# Patient Record
Sex: MALE | Race: WHITE | NOT HISPANIC OR LATINO | Employment: OTHER | URBAN - METROPOLITAN AREA
[De-identification: names, ages, dates, MRNs, and addresses within clinical notes are randomized per-mention and may not be internally consistent; named-entity substitution may affect disease eponyms.]

---

## 2018-05-04 DIAGNOSIS — I35.0 NODULAR CALCIFIC AORTIC VALVE STENOSIS: Primary | ICD-10-CM

## 2018-05-24 RX ORDER — POTASSIUM CHLORIDE 20 MEQ/1
TABLET, EXTENDED RELEASE ORAL
COMMUNITY
Start: 2018-03-15

## 2018-05-24 RX ORDER — ATENOLOL AND CHLORTHALIDONE TABLET 50; 25 MG/1; MG/1
TABLET ORAL
COMMUNITY
Start: 2018-04-23

## 2018-05-24 RX ORDER — SIMVASTATIN 40 MG/1
TABLET, FILM COATED ORAL
COMMUNITY
Start: 2018-04-23

## 2018-05-24 RX ORDER — POLYETHYLENE GLYCOL 3350 17 G/17G
POWDER, FOR SOLUTION ORAL
COMMUNITY
Start: 2018-04-17

## 2018-05-24 RX ORDER — TRAMADOL HYDROCHLORIDE 50 MG/1
TABLET ORAL
COMMUNITY
Start: 2018-05-04

## 2018-05-24 RX ORDER — FUROSEMIDE 20 MG/1
TABLET ORAL
COMMUNITY
Start: 2018-04-10 | End: 2018-09-07 | Stop reason: DRUGHIGH

## 2018-05-24 RX ORDER — FUROSEMIDE 40 MG/1
TABLET ORAL
COMMUNITY
Start: 2018-04-20

## 2018-05-24 RX ORDER — MUPIROCIN 20 MG/G
OINTMENT TOPICAL
COMMUNITY
Start: 2018-03-22

## 2018-05-28 ENCOUNTER — OFFICE VISIT (OUTPATIENT)
Dept: CARDIOLOGY | Facility: CLINIC | Age: 78
End: 2018-05-28
Payer: MEDICARE

## 2018-05-28 ENCOUNTER — HOSPITAL ENCOUNTER (OUTPATIENT)
Dept: PULMONOLOGY | Facility: CLINIC | Age: 78
Discharge: HOME | End: 2018-05-28
Payer: MEDICARE

## 2018-05-28 ENCOUNTER — HOSPITAL ENCOUNTER (OUTPATIENT)
Dept: CARDIOLOGY | Facility: CLINIC | Age: 78
Discharge: HOME | End: 2018-05-28
Attending: INTERNAL MEDICINE
Payer: MEDICARE

## 2018-05-28 ENCOUNTER — HOSPITAL ENCOUNTER (OUTPATIENT)
Dept: RADIOLOGY | Facility: HOSPITAL | Age: 78
Discharge: HOME | End: 2018-05-28
Attending: INTERNAL MEDICINE
Payer: MEDICARE

## 2018-05-28 VITALS
DIASTOLIC BLOOD PRESSURE: 60 MMHG | WEIGHT: 235.88 LBS | OXYGEN SATURATION: 97 % | HEIGHT: 78 IN | BODY MASS INDEX: 27.29 KG/M2 | HEART RATE: 84 BPM | SYSTOLIC BLOOD PRESSURE: 119 MMHG

## 2018-05-28 VITALS — HEIGHT: 71 IN | WEIGHT: 234 LBS | BODY MASS INDEX: 32.76 KG/M2

## 2018-05-28 DIAGNOSIS — I35.0 NODULAR CALCIFIC AORTIC VALVE STENOSIS: Primary | ICD-10-CM

## 2018-05-28 DIAGNOSIS — I35.0 NODULAR CALCIFIC AORTIC VALVE STENOSIS: ICD-10-CM

## 2018-05-28 DIAGNOSIS — K21.9 GASTROESOPHAGEAL REFLUX DISEASE WITHOUT ESOPHAGITIS: ICD-10-CM

## 2018-05-28 DIAGNOSIS — N18.9 CHRONIC KIDNEY DISEASE, UNSPECIFIED CKD STAGE: ICD-10-CM

## 2018-05-28 DIAGNOSIS — M45.9 ANKYLOSING SPONDYLITIS, UNSPECIFIED SITE OF SPINE: ICD-10-CM

## 2018-05-28 DIAGNOSIS — I35.0 NONRHEUMATIC AORTIC VALVE STENOSIS: Primary | ICD-10-CM

## 2018-05-28 DIAGNOSIS — I10 HYPERTENSION, UNSPECIFIED TYPE: ICD-10-CM

## 2018-05-28 DIAGNOSIS — E78.49 OTHER HYPERLIPIDEMIA: ICD-10-CM

## 2018-05-28 LAB
AORTIC VALVE STENOSIS: ABNORMAL
ESTIMATED PA SYSTOLIC PRESSURE: 30.69
MITRAL VALVE MOBILITY: NORMAL
MITRAL VALVE STENOSIS: ABNORMAL
PRE FEV1 FVC: 72
PRE FEV1: 1.75
PRE FVC: 2.43
PREDICTED FEV1 FVC: 77
PREDICTED FEV1: 3.16
PREDICTED FVC: 4.04
RETIRED EF AND QEF - SEE NOTES: 65 (ref 55–65)
TRICUSPID VALVE REGURGITATION: ABNORMAL

## 2018-05-28 PROCEDURE — 99999 PR PBB SHADOW E&M-EST. PATIENT-LVL III: CPT | Mod: PBBFAC,,,

## 2018-05-28 PROCEDURE — 82803 BLOOD GASES ANY COMBINATION: CPT | Mod: S$GLB,,, | Performed by: INTERNAL MEDICINE

## 2018-05-28 PROCEDURE — 96374 THER/PROPH/DIAG INJ IV PUSH: CPT | Mod: 59,S$GLB,, | Performed by: INTERNAL MEDICINE

## 2018-05-28 PROCEDURE — 94010 BREATHING CAPACITY TEST: CPT | Mod: S$GLB,,, | Performed by: INTERNAL MEDICINE

## 2018-05-28 PROCEDURE — 94618 PULMONARY STRESS TESTING: CPT | Mod: S$GLB,,, | Performed by: INTERNAL MEDICINE

## 2018-05-28 PROCEDURE — 3074F SYST BP LT 130 MM HG: CPT | Mod: CPTII,S$GLB,, | Performed by: THORACIC SURGERY (CARDIOTHORACIC VASCULAR SURGERY)

## 2018-05-28 PROCEDURE — 74174 CTA ABD&PLVS W/CONTRAST: CPT | Mod: 26,,, | Performed by: RADIOLOGY

## 2018-05-28 PROCEDURE — 3078F DIAST BP <80 MM HG: CPT | Mod: CPTII,S$GLB,, | Performed by: THORACIC SURGERY (CARDIOTHORACIC VASCULAR SURGERY)

## 2018-05-28 PROCEDURE — 36600 WITHDRAWAL OF ARTERIAL BLOOD: CPT | Mod: S$GLB,,, | Performed by: INTERNAL MEDICINE

## 2018-05-28 PROCEDURE — 99205 OFFICE O/P NEW HI 60 MIN: CPT | Mod: S$GLB,,, | Performed by: THORACIC SURGERY (CARDIOTHORACIC VASCULAR SURGERY)

## 2018-05-28 PROCEDURE — 25500020 PHARM REV CODE 255: Performed by: INTERNAL MEDICINE

## 2018-05-28 PROCEDURE — 93306 TTE W/DOPPLER COMPLETE: CPT | Mod: S$GLB,,, | Performed by: INTERNAL MEDICINE

## 2018-05-28 PROCEDURE — 3078F DIAST BP <80 MM HG: CPT | Mod: CPTII,S$GLB,, | Performed by: INTERNAL MEDICINE

## 2018-05-28 PROCEDURE — 94729 DIFFUSING CAPACITY: CPT | Mod: S$GLB,,, | Performed by: INTERNAL MEDICINE

## 2018-05-28 PROCEDURE — 99204 OFFICE O/P NEW MOD 45 MIN: CPT | Mod: S$GLB,,, | Performed by: INTERNAL MEDICINE

## 2018-05-28 PROCEDURE — 71275 CT ANGIOGRAPHY CHEST: CPT | Mod: 26,,, | Performed by: RADIOLOGY

## 2018-05-28 PROCEDURE — 3074F SYST BP LT 130 MM HG: CPT | Mod: CPTII,S$GLB,, | Performed by: INTERNAL MEDICINE

## 2018-05-28 PROCEDURE — 71275 CT ANGIOGRAPHY CHEST: CPT | Mod: TC

## 2018-05-28 RX ORDER — OMEPRAZOLE 20 MG/1
20 CAPSULE, DELAYED RELEASE ORAL
COMMUNITY

## 2018-05-28 RX ORDER — ASPIRIN 81 MG/1
81 TABLET ORAL
COMMUNITY

## 2018-05-28 RX ORDER — CLOPIDOGREL BISULFATE 75 MG/1
75 TABLET ORAL DAILY
Qty: 30 TABLET | Refills: 11 | Status: ON HOLD | OUTPATIENT
Start: 2018-05-28 | End: 2018-06-08 | Stop reason: HOSPADM

## 2018-05-28 RX ORDER — SODIUM CHLORIDE 0.9 % (FLUSH) 0.9 %
5 SYRINGE (ML) INJECTION
Status: CANCELLED | OUTPATIENT
Start: 2018-05-28

## 2018-05-28 RX ORDER — SODIUM CHLORIDE 9 MG/ML
INJECTION, SOLUTION INTRAVENOUS ONCE
Status: CANCELLED | OUTPATIENT
Start: 2018-05-28 | End: 2018-05-28

## 2018-05-28 RX ORDER — DIPHENHYDRAMINE HCL 25 MG
50 CAPSULE ORAL
Status: CANCELLED | OUTPATIENT
Start: 2018-05-28

## 2018-05-28 RX ORDER — ACETAMINOPHEN 500 MG
500 TABLET ORAL
COMMUNITY

## 2018-05-28 RX ADMIN — IOHEXOL 100 ML: 350 INJECTION, SOLUTION INTRAVENOUS at 10:05

## 2018-05-28 NOTE — PROGRESS NOTES
"Subjective:    Patient ID:  Saumya Wilcox is a 78 y.o. male who presents for evaluation of Aortic Stenosis  Dr. Rojo    HPI   Mr. Wilcox is a 78 year old gentleman referred by Dr. Rojo for evaluation of Aortic Stenosis. He has a PMHx of chronic hip and current back pain (on tramodol multiple times a day) walks with a walker, ankylosing spondylitis, HTN, HLD, chronic diastolic heart failure, GERD. He cannot perform his ADLs and has assistance by family. He recalls being told about his Aortic Stenosis about a year ago and his SOB/AMAYA has worsened over the past 6 months. He did have rheumatic fever as a child at age 6. He believes that he was told he "had a hole in his heart" but he did not have a bubble study today. He gets dyspneic ambulating 50ft with his walker to the mailbox. He denies chest pain, light headedness, syncope. He spends his days in a chair and only ambulates with his walker to get the mail. His SOB is somewhat alleviated with Lasix. He has orthopnea for which he sleeps in a recliner. He does drink 2+ cokes a day. He has never had a coronary angiogram.     Saumya Wilcox is a 78 y.o. male referred by Dr Rojo for evaluation of severe AS (NYHA Class IV sx).    he has undergone the following TAVR work-up:   ECHO (Date 5/25/2018): JOEY 0.84 cm2, AVAi 0.36 cm2/m2, peak aortic jet velocity 4.6 m/s,MG 58 mmHg, LVEF 65%  LHC (Date ): Needs   STS: 4.63%   Frailty: 3/4   Iliacs are >9.54 on L and > 9.47 on R   LVOT area by CTA is 4.86 cm2 and Avg Diameter is 24.9 (distance 29.0 mm x 21.6 mm) per Dr Phelps   CT Incidental Findings: Ankylosing Spondylitis   CTS risk assessment: Inoperable due to impaired mobility per Dr. Aburto   he is currently Cohort C, per Dr Phelps due to debility, impaired mobility   Rhythm Issues: Pending   6 MWT: 500 ft (in 3:39)  PFTs: moderate FEV1 56% predicted, FVC 60% predicted, MVV 38% predicted, DLCO 102% predicted    If able to pre-hab may be a candidate for 26mm S3 " (7% OS) via RTF access.       Review of Systems   Constitution: Positive for weakness. Negative for chills, diaphoresis, fever, weight gain and weight loss.   HENT: Negative for sore throat.    Eyes: Negative for blurred vision, vision loss in left eye, vision loss in right eye and visual disturbance.   Cardiovascular: Positive for dyspnea on exertion and orthopnea. Negative for chest pain, claudication, leg swelling, near-syncope, palpitations, paroxysmal nocturnal dyspnea and syncope.   Respiratory: Positive for shortness of breath. Negative for cough, hemoptysis, sputum production and wheezing.    Endocrine: Negative for cold intolerance and heat intolerance.   Hematologic/Lymphatic: Negative for adenopathy. Does not bruise/bleed easily.   Skin: Negative for rash.   Musculoskeletal: Positive for joint pain and muscle weakness. Negative for falls and myalgias.   Gastrointestinal: Negative for abdominal pain, change in bowel habit, constipation, diarrhea, melena and nausea.   Genitourinary: Positive for bladder incontinence.   Neurological: Negative for dizziness, focal weakness, headaches, light-headedness and numbness.   Psychiatric/Behavioral: Negative for altered mental status.        History reviewed. No pertinent past medical history.  Current Outpatient Prescriptions on File Prior to Visit   Medication Sig Dispense Refill    atenolol-chlorthalidone (TENORETIC) 50-25 mg Tab       furosemide (LASIX) 20 MG tablet       furosemide (LASIX) 40 MG tablet       mupirocin (BACTROBAN) 2 % ointment       polyethylene glycol (GLYCOLAX) 17 gram/dose powder       potassium chloride SA (K-DUR,KLOR-CON) 20 MEQ tablet       simvastatin (ZOCOR) 40 MG tablet       traMADol (ULTRAM) 50 mg tablet        Current Facility-Administered Medications on File Prior to Visit   Medication Dose Route Frequency Provider Last Rate Last Dose    [COMPLETED] omnipaque 350 iohexol 100 mL  100 mL Intravenous ONCE PRN Beto Phelps,  "MD   100 mL at 05/28/18 1035     Vitals:    05/28/18 1438 05/28/18 1442   BP: 125/64 119/60   BP Location: Left arm Right arm   Patient Position: Sitting Sitting   BP Method: Large (Automatic) Large (Automatic)   Pulse: 86 84   SpO2: 97%    Weight: 107 kg (235 lb 14.3 oz)    Height: 6' 6" (1.981 m)      Body mass index is 27.26 kg/m².         Objective:    Physical Exam   Constitutional: He is oriented to person, place, and time. He appears well-developed and well-nourished.   HENT:   Head: Normocephalic and atraumatic.   Eyes: EOM are normal. Pupils are equal, round, and reactive to light.   Neck: Neck supple. JVD present. No tracheal deviation present. No thyromegaly present.   Cardiovascular: Normal rate, regular rhythm, S1 normal, S2 normal, intact distal pulses and normal pulses.  PMI is not displaced.  Exam reveals no gallop and no friction rub.    Murmur heard.  Pulmonary/Chest: Effort normal and breath sounds normal. No respiratory distress. He has no wheezes. He has no rales. He exhibits no tenderness.   Abdominal: Soft. Bowel sounds are normal. He exhibits distension. He exhibits no mass. There is no hepatosplenomegaly. There is no tenderness.   Musculoskeletal: Normal range of motion. He exhibits edema. He exhibits no tenderness.   Neurological: He is alert and oriented to person, place, and time.   Skin: Skin is warm and dry. No rash noted.   Psychiatric: He has a normal mood and affect. His behavior is normal.         Assessment:         Nodular calcific aortic valve stenosis  Saumya Wilcox is a 78 y.o. male referred by Dr Rojo for evaluation of severe AS (NYHA Class IV sx).    he has undergone the following TAVR work-up:   ECHO (Date 5/25/2018): JOEY 0.84 cm2, AVAi 0.36 cm2/m2, peak aortic jet velocity 4.6 m/s,MG 58 mmHg, LVEF 65%  LHC (Date ): Needs   STS: 4.63%   Frailty: 3/4   Iliacs are >9.54 on L and > 9.47 on R   LVOT area by CTA is 4.86 cm2 and Avg Diameter is 24.9 (distance 29.0 mm x 21.6 " mm) per Dr Phelps   CT Incidental Findings: Ankylosing Spondylitis   CTS risk assessment: Inoperable due to impaired mobility per Dr. Aburto   he is currently Cohort C, per Dr Phelps due to debility, impaired mobility   Rhythm Issues: Pending   6 MWT: 500 ft (in 3:39)  PFTs: moderate FEV1 56% predicted, FVC 60% predicted, MVV 38% predicted, DLCO 102% predicted    If able to pre-hab may be a candidate for 26mm S3 (7% OS) via RTF access.       HTN (hypertension)  Well controlled on current regimen     Other hyperlipidemia  Stable on statin     Gastroesophageal reflux disease without esophagitis  On PPI     Ankylosing spondylitis  On tramadol for pain  Does not follow with anyone for this  Found on CTA     Plan:       Coronary Angiogram +/- PCI with Dr. Phelps   Risks, Benefits, and alternatives of the procedure were discussed in detail with the patient. Questions were answered and patient has voiced understanding. He is agreeable to proceed. Informed consent obtained.   L fem access   Continue ASA, Load Plavix  Check PRU upon arrival   Will re-check frailty during that admission   Educated patient and family on TAVR and to pre-hab as bridge to TAVR     Staff:  I have personally taken the history and examined this patient and agree with the fellow's note as stated above and amended it accordingly :-) Patient has multiple body aches and pains and also has Ankylosing Spondylitis.  He is chronically on tramadol for all of his pains (back, hip, shoulder, neck pains).  He is 4/4 Frail and sits in a chair all day.  I told him very strongly that if he didn't prehab we will not be able to offer him TAVR.  Will recheck frailty when he returns for his diagnostic cath.

## 2018-05-28 NOTE — ASSESSMENT & PLAN NOTE
Saumya Wilcox is a 78 y.o. male referred by Dr Rojo for evaluation of severe AS (NYHA Class IV sx).    he has undergone the following TAVR work-up:   ECHO (Date 5/25/2018): JEOY 0.84 cm2, AVAi 0.36 cm2/m2, peak aortic jet velocity 4.6 m/s,MG 58 mmHg, LVEF 65%  LHC (Date ): Needs   STS: 4.63%   Frailty: 3/4   Iliacs are >9.54 on L and > 9.47 on R   LVOT area by CTA is 4.86 cm2 and Avg Diameter is 24.9 (distance 29.0 mm x 21.6 mm) per Dr Phelps   CT Incidental Findings: Ankylosing Spondylitis   CTS risk assessment: Inoperable due to impaired mobility per Dr. Aburto   he is currently Cohort C, per Dr Phelps due to debility, impaired mobility   Rhythm Issues: Pending   6 MWT: 500 ft (in 3:39)  PFTs: moderate FEV1 56% predicted, FVC 60% predicted, MVV 38% predicted, DLCO 102% predicted    If able to pre-hab may be a candidate for 26mm S3 (7% OS) via RTF access.

## 2018-05-28 NOTE — PROGRESS NOTES
Patient identified via spelling of name and date of birth. Patient denies blood transfusion reaction. Consent obtained for use of contrast. Optison 3ml IVP vortristin Merlos. 22 gauge saline lock started in right forearm under aseptic technique. Optison 3ml IVP used as contrast for 2 d imaging. Saline lock d/dileep and pressure dressing applied. Tolerated procedure well.

## 2018-05-29 NOTE — PROCEDURES
Saumya Wilcox is a 78 y.o.  male patient, who presents for a 6 minute walk test ordered by Mattie SLADE  The diagnosis is Aortic Valve Disorder.  The patient's BMI is 32.7 kg/m2.  Predicted distance (lower limit of normal) is 248.35 meters.      Test Results:    The test was completed with stops.  The patient stopped 1 times for a total of 92 seconds.  The total time walked was 268 seconds.  During walking, the patient reported:  Dyspnea. The patient used a walker  during testing.     05/28/2018---------Distance: 152.4 meters (500 feet)     O2 Sat % Supplemental Oxygen Heart Rate Blood Pressure Honey Scale   Pre-exercise  (Resting) 96 % Room Air 70 bpm 121/57 mmHg 0   During Exercise 98 % Room Air 100 bpm 146/69 mmHg 5-6   Post-exercise  (Recovery) 96 % Room Air  91 bpm   mmHg       Recovery Time: 210 seconds    Performing nurse/tech: Estopinal RRT      PREVIOUS STUDY:   The patient has not had a previous study.      CLINICAL INTERPRETATION:  Six minute walk distance is 152.4 meters (500 feet) with heavy dyspnea.  During exercise, there was no significant desaturation while breathing room air.  Blood pressure remained stable and Heart rate increased significantly with walking.  This may represent a tachycardic response to exercise.  The patient did not report non-pulmonary symptoms during exercise.  Significant exercise impairment is likely due to cardiovascular causes.  The patient did complete the study, walking 268 seconds of the 360 second test.  No previous study performed.  Based upon age and body mass index, exercise capacity is less than predicted.

## 2018-06-01 NOTE — PROGRESS NOTES
"HPI   Mr. Wilcox is a 78 year old gentleman referred by Dr. Rojo for evaluation of Aortic Stenosis. He has a PMHx of chronic hip and current back pain (on tramodol multiple times a day) walks with a walker, ankylosing spondylitis, HTN, HLD, chronic diastolic heart failure, GERD. He cannot perform his ADLs and has assistance by family. He recalls being told about his Aortic Stenosis about a year ago and his SOB/AMAYA has worsened over the past 6 months. He did have rheumatic fever as a child at age 6. He believes that he was told he "had a hole in his heart" but he did not have a bubble study today. He gets dyspneic ambulating 50ft with his walker to the mailbox. He denies chest pain, light headedness, syncope. He spends his days in a chair and only ambulates with his walker to get the mail. His SOB is somewhat alleviated with Lasix. He has orthopnea for which he sleeps in a recliner. He does drink 2+ cokes a day. He has never had a coronary angiogram.      Saumya Wilcox is a 78 y.o. male referred by Dr Rojo for evaluation of severe AS (NYHA Class IV sx).     he has undergone the following TAVR work-up:   · ECHO (Date 5/25/2018): JOEY 0.84 cm2, AVAi 0.36 cm2/m2, peak aortic jet velocity 4.6 m/s,MG 58 mmHg, LVEF 65%  · LHC (Date ): Needs   · STS: 4.63%   · Frailty: 3/4   · Iliacs are >9.54 on L and > 9.47 on R   · LVOT area by CTA is 4.86 cm2 and Avg Diameter is 24.9 (distance 29.0 mm x 21.6 mm) per Dr Phelps   · CT Incidental Findings: Ankylosing Spondylitis     · Rhythm Issues: Pending   · 6 MWT: 500 ft (in 3:39)  · PFTs: moderate FEV1 56% predicted, FVC 60% predicted, MVV 38% predicted, DLCO 102% predicted     If able to pre-hab may be a candidate for 26mm S3 (7% OS) via RTF access.         Review of Systems   Constitution: Positive for weakness. Negative for chills, diaphoresis, fever, weight gain and weight loss.   HENT: Negative for sore throat.    Eyes: Negative for blurred vision, vision loss in left " eye, vision loss in right eye and visual disturbance.   Cardiovascular: Positive for dyspnea on exertion and orthopnea. Negative for chest pain, claudication, leg swelling, near-syncope, palpitations, paroxysmal nocturnal dyspnea and syncope.   Respiratory: Positive for shortness of breath. Negative for cough, hemoptysis, sputum production and wheezing.    Endocrine: Negative for cold intolerance and heat intolerance.   Hematologic/Lymphatic: Negative for adenopathy. Does not bruise/bleed easily.   Skin: Negative for rash.   Musculoskeletal: Positive for joint pain and muscle weakness. Negative for falls and myalgias.   Gastrointestinal: Negative for abdominal pain, change in bowel habit, constipation, diarrhea, melena and nausea.   Genitourinary: Positive for bladder incontinence.   Neurological: Negative for dizziness, focal weakness, headaches, light-headedness and numbness.   Psychiatric/Behavioral: Negative for altered mental status.         History reviewed. No pertinent past medical history.         Current Outpatient Prescriptions on File Prior to Visit   Medication Sig Dispense Refill    atenolol-chlorthalidone (TENORETIC) 50-25 mg Tab          furosemide (LASIX) 20 MG tablet          furosemide (LASIX) 40 MG tablet          mupirocin (BACTROBAN) 2 % ointment          polyethylene glycol (GLYCOLAX) 17 gram/dose powder          potassium chloride SA (K-DUR,KLOR-CON) 20 MEQ tablet          simvastatin (ZOCOR) 40 MG tablet          traMADol (ULTRAM) 50 mg tablet                      Current Facility-Administered Medications on File Prior to Visit   Medication Dose Route Frequency Provider Last Rate Last Dose    [COMPLETED] omnipaque 350 iohexol 100 mL  100 mL Intravenous ONCE PRN Beto Phelsp MD   100 mL at 05/28/18 1035      Vitals        Vitals:     05/28/18 1438 05/28/18 1442   BP: 125/64 119/60   BP Location: Left arm Right arm   Patient Position: Sitting Sitting   BP Method: Large  "(Automatic) Large (Automatic)   Pulse: 86 84   SpO2: 97%     Weight: 107 kg (235 lb 14.3 oz)     Height: 6' 6" (1.981 m)           Body mass index is 27.26 kg/m².        Objective:    Physical Exam   Constitutional: He is oriented to person, place, and time. He appears well-developed and well-nourished.   HENT:   Head: Normocephalic and atraumatic.   Eyes: EOM are normal. Pupils are equal, round, and reactive to light.   Neck: Neck supple. JVD present. No tracheal deviation present. No thyromegaly present.   Cardiovascular: Normal rate, regular rhythm, S1 normal, S2 normal, intact distal pulses and normal pulses.  PMI is not displaced.  Exam reveals no gallop and no friction rub.    Murmur heard.  Pulmonary/Chest: Effort normal and breath sounds normal. No respiratory distress. He has no wheezes. He has no rales. He exhibits no tenderness.   Abdominal: Soft. Bowel sounds are normal. He exhibits distension. He exhibits no mass. There is no hepatosplenomegaly. There is no tenderness.   Musculoskeletal: Normal range of motion. He exhibits edema. He exhibits no tenderness.   Neurological: He is alert and oriented to person, place, and time.   Skin: Skin is warm and dry. No rash noted.   Psychiatric: He has a normal mood and affect. His behavior is normal.          Assessment:         Nodular calcific aortic valve stenosis  Saumya Wilcox is a 78 y.o. male referred by Dr Rojo for evaluation of severe AS (NYHA Class IV sx).        Plan:     Inoperable due to impaired mobility and severe debility, currently cohort C for TAVR and will prehab  "

## 2018-06-08 ENCOUNTER — SURGERY (OUTPATIENT)
Age: 78
End: 2018-06-08

## 2018-06-08 ENCOUNTER — HOSPITAL ENCOUNTER (OUTPATIENT)
Facility: HOSPITAL | Age: 78
Discharge: HOME | End: 2018-06-08
Attending: INTERNAL MEDICINE | Admitting: INTERNAL MEDICINE
Payer: MEDICARE

## 2018-06-08 VITALS
DIASTOLIC BLOOD PRESSURE: 55 MMHG | WEIGHT: 240 LBS | HEART RATE: 77 BPM | TEMPERATURE: 98 F | BODY MASS INDEX: 30.8 KG/M2 | SYSTOLIC BLOOD PRESSURE: 108 MMHG | OXYGEN SATURATION: 96 % | HEIGHT: 74 IN | RESPIRATION RATE: 20 BRPM

## 2018-06-08 DIAGNOSIS — N18.9 CHRONIC KIDNEY DISEASE, UNSPECIFIED CKD STAGE: ICD-10-CM

## 2018-06-08 DIAGNOSIS — I10 ESSENTIAL (PRIMARY) HYPERTENSION: ICD-10-CM

## 2018-06-08 DIAGNOSIS — I35.0 NODULAR CALCIFIC AORTIC VALVE STENOSIS: ICD-10-CM

## 2018-06-08 LAB
ABO + RH BLD: NORMAL
ANION GAP SERPL CALC-SCNC: 13 MMOL/L
APTT BLDCRRT: 24.3 SEC
BASOPHILS # BLD AUTO: 0.02 K/UL
BASOPHILS NFR BLD: 0.2 %
BLD GP AB SCN CELLS X3 SERPL QL: NORMAL
BUN SERPL-MCNC: 23 MG/DL
CALCIUM SERPL-MCNC: 9.6 MG/DL
CHLORIDE SERPL-SCNC: 90 MMOL/L
CO2 SERPL-SCNC: 36 MMOL/L
CORONARY STENOSIS: ABNORMAL
CREAT SERPL-MCNC: 1 MG/DL
DIFFERENTIAL METHOD: ABNORMAL
EOSINOPHIL # BLD AUTO: 0.1 K/UL
EOSINOPHIL NFR BLD: 0.7 %
ERYTHROCYTE [DISTWIDTH] IN BLOOD BY AUTOMATED COUNT: 12.3 %
EST. GFR  (AFRICAN AMERICAN): >60 ML/MIN/1.73 M^2
EST. GFR  (NON AFRICAN AMERICAN): >60 ML/MIN/1.73 M^2
GLUCOSE SERPL-MCNC: 129 MG/DL
HCT VFR BLD AUTO: 41 %
HGB BLD-MCNC: 14.4 G/DL
IMM GRANULOCYTES # BLD AUTO: 0.03 K/UL
IMM GRANULOCYTES NFR BLD AUTO: 0.3 %
INR PPP: 0.9
LYMPHOCYTES # BLD AUTO: 1.7 K/UL
LYMPHOCYTES NFR BLD: 17.1 %
MCH RBC QN AUTO: 34.1 PG
MCHC RBC AUTO-ENTMCNC: 35.1 G/DL
MCV RBC AUTO: 97 FL
MONOCYTES # BLD AUTO: 0.6 K/UL
MONOCYTES NFR BLD: 6.4 %
NEUTROPHILS # BLD AUTO: 7.6 K/UL
NEUTROPHILS NFR BLD: 75.3 %
NRBC BLD-RTO: 0 /100 WBC
PLATELET # BLD AUTO: 217 K/UL
PLATELET RESPONSE PLAVIX: 147 PRU
PMV BLD AUTO: 9.4 FL
POTASSIUM SERPL-SCNC: 2.6 MMOL/L
PROTHROMBIN TIME: 9.9 SEC
RBC # BLD AUTO: 4.22 M/UL
SODIUM SERPL-SCNC: 139 MMOL/L
WBC # BLD AUTO: 10.02 K/UL

## 2018-06-08 PROCEDURE — 93005 ELECTROCARDIOGRAM TRACING: CPT

## 2018-06-08 PROCEDURE — 99152 MOD SED SAME PHYS/QHP 5/>YRS: CPT

## 2018-06-08 PROCEDURE — 63600175 PHARM REV CODE 636 W HCPCS

## 2018-06-08 PROCEDURE — 25000003 PHARM REV CODE 250: Performed by: NURSE PRACTITIONER

## 2018-06-08 PROCEDURE — 25000003 PHARM REV CODE 250: Performed by: INTERNAL MEDICINE

## 2018-06-08 PROCEDURE — 25000003 PHARM REV CODE 250

## 2018-06-08 PROCEDURE — 80048 BASIC METABOLIC PNL TOTAL CA: CPT

## 2018-06-08 PROCEDURE — 85730 THROMBOPLASTIN TIME PARTIAL: CPT

## 2018-06-08 PROCEDURE — 85576 BLOOD PLATELET AGGREGATION: CPT

## 2018-06-08 PROCEDURE — 86901 BLOOD TYPING SEROLOGIC RH(D): CPT

## 2018-06-08 PROCEDURE — 93010 ELECTROCARDIOGRAM REPORT: CPT | Mod: ,,, | Performed by: INTERNAL MEDICINE

## 2018-06-08 PROCEDURE — 85025 COMPLETE CBC W/AUTO DIFF WBC: CPT

## 2018-06-08 PROCEDURE — 93454 CORONARY ARTERY ANGIO S&I: CPT | Mod: 26,,, | Performed by: INTERNAL MEDICINE

## 2018-06-08 PROCEDURE — 85610 PROTHROMBIN TIME: CPT

## 2018-06-08 PROCEDURE — 99152 MOD SED SAME PHYS/QHP 5/>YRS: CPT | Mod: ,,, | Performed by: INTERNAL MEDICINE

## 2018-06-08 RX ORDER — DIPHENHYDRAMINE HCL 50 MG
50 CAPSULE ORAL
Status: DISCONTINUED | OUTPATIENT
Start: 2018-06-08 | End: 2018-06-08 | Stop reason: HOSPADM

## 2018-06-08 RX ORDER — SODIUM CHLORIDE 9 MG/ML
INJECTION, SOLUTION INTRAVENOUS ONCE
Status: COMPLETED | OUTPATIENT
Start: 2018-06-08 | End: 2018-06-08

## 2018-06-08 RX ORDER — ACETAMINOPHEN 325 MG/1
650 TABLET ORAL EVERY 4 HOURS PRN
Status: DISCONTINUED | OUTPATIENT
Start: 2018-06-08 | End: 2018-06-08 | Stop reason: HOSPADM

## 2018-06-08 RX ORDER — DEXTROSE MONOHYDRATE AND SODIUM CHLORIDE 5; .45 G/100ML; G/100ML
INJECTION, SOLUTION INTRAVENOUS CONTINUOUS
Status: DISCONTINUED | OUTPATIENT
Start: 2018-06-08 | End: 2018-06-08 | Stop reason: HOSPADM

## 2018-06-08 RX ORDER — POTASSIUM CHLORIDE 20 MEQ/1
60 TABLET, EXTENDED RELEASE ORAL ONCE
Status: COMPLETED | OUTPATIENT
Start: 2018-06-08 | End: 2018-06-08

## 2018-06-08 RX ORDER — SODIUM CHLORIDE 0.9 % (FLUSH) 0.9 %
5 SYRINGE (ML) INJECTION
Status: DISCONTINUED | OUTPATIENT
Start: 2018-06-08 | End: 2018-06-08 | Stop reason: HOSPADM

## 2018-06-08 RX ORDER — TRAMADOL HYDROCHLORIDE 50 MG/1
50 TABLET ORAL EVERY 6 HOURS PRN
Status: DISCONTINUED | OUTPATIENT
Start: 2018-06-08 | End: 2018-06-08 | Stop reason: HOSPADM

## 2018-06-08 RX ADMIN — DIPHENHYDRAMINE HYDROCHLORIDE 50 MG: 50 CAPSULE ORAL at 07:06

## 2018-06-08 RX ADMIN — SODIUM CHLORIDE: 0.9 INJECTION, SOLUTION INTRAVENOUS at 07:06

## 2018-06-08 RX ADMIN — TRAMADOL HYDROCHLORIDE 50 MG: 50 TABLET, FILM COATED ORAL at 01:06

## 2018-06-08 RX ADMIN — ACETAMINOPHEN 650 MG: 325 TABLET ORAL at 12:06

## 2018-06-08 RX ADMIN — POTASSIUM CHLORIDE 60 MEQ: 1500 TABLET, EXTENDED RELEASE ORAL at 08:06

## 2018-06-08 NOTE — NURSING
Discharge instructions and medication list given and reviewed with patient. Pt states understanding.  Tele and IV d/c'd.  R wrist site remains CDI, no bleeding or hematoma noted.  Instructed patient to call with any questions or concerns.

## 2018-06-08 NOTE — PROGRESS NOTES
Admitted to SSCU for procedure. VSS. AAx4. No c/o any pain or issues at present. Ambulates well with walker. Pre procedure process reviewed.

## 2018-06-08 NOTE — DISCHARGE SUMMARY
[facility]  Short Stay  Discharge Summary    Admit Date: 6/8/2018    Discharge Date and Time:  06/08/2018      Discharge Attending Physician: Beto Phelps MD   [facility]   Mr. Wilcox is a 78-yo man with severe AS who presented for [facility] as part of pre-TAVR workup.  Because of ankylosing spondylitis, he was unable to lay completely flat for femoral access, so radial access was pursued.  He tolerated LHC via R radial access without complication.  Please see cath report for details.  He does have significant RCA disease, however a non-dominant RCA, with no obstructive disease on the left.  He was able to walk from the garage to short-stay unit today, and needs to continue to prehab prior to TAVR.  He was able to get out of bed on his own as well, and frailty was re-checked and was 2/4 and much improved.  He will follow up for continued TAVR eval.      he has undergone the following TAVR work-up:   · ECHO (Date 5/25/2018): JOEY 0.84 cm2, AVAi 0.36 cm2/m2, peak aortic jet velocity 4.6 m/s,MG 58 mmHg, LVEF 65%  · LHC (Date 6/8/18): Non-obstructive CAD, non-dom RCA  · STS: 4.63%   · Frailty: 2/4 (repeated 6/8/18, improved)  · Iliacs are >9.54 on L and > 9.47 on R   · LVOT area by CTA is 4.86 cm2 and Avg Diameter is 24.9 (distance 29.0 mm x 21.6 mm) per Dr Phelps   · CT Incidental Findings: Ankylosing Spondylitis   · CTS risk assessment: Inoperable due to impaired mobility per Dr. Aburto   · Rhythm Issues: RBBB and LAFB  · 6 MWT: 500 ft (in 3:39)  · PFTs: moderate FEV1 56% predicted, FVC 60% predicted, MVV 38% predicted, DLCO 102% predicted     With continued pre-hab would be a candidate for 26mm S3 (7% OS) via RTF access.     Final Diagnoses:    Principal Problem: Nodular calcific aortic valve stenosis   Secondary Diagnoses:   Active Hospital Problems    Diagnosis  POA    *Nodular calcific aortic valve stenosis [I35.0]  Yes      Resolved Hospital Problems     Diagnosis Date Resolved POA   No resolved problems to display.       Discharged Condition: good    Disposition: Home or Self Care    Follow up/Patient Instructions:    Medications:  Reconciled Home Medications:      Medication List      CONTINUE taking these medications    acetaminophen 500 MG tablet  Commonly known as:  TYLENOL  Take 500 mg by mouth.     ALLERFED COLD AND ALLERGY ORAL  Take 1 tablet by mouth once daily.     aspirin 81 MG EC tablet  Commonly known as:  ECOTRIN  Take 81 mg by mouth.     atenolol-chlorthalidone 50-25 mg Tab  Commonly known as:  TENORETIC     * furosemide 20 MG tablet  Commonly known as:  LASIX     * furosemide 40 MG tablet  Commonly known as:  LASIX     mupirocin 2 % ointment  Commonly known as:  BACTROBAN     omeprazole 20 MG capsule  Commonly known as:  PRILOSEC  Take 20 mg by mouth.     polyethylene glycol 17 gram/dose powder  Commonly known as:  GLYCOLAX     potassium chloride SA 20 MEQ tablet  Commonly known as:  K-DUR,KLOR-CON     simvastatin 40 MG tablet  Commonly known as:  ZOCOR     traMADol 50 mg tablet  Commonly known as:  ULTRAM        * This list has 2 medication(s) that are the same as other medications prescribed for you. Read the directions carefully, and ask your doctor or other care provider to review them with you.            STOP taking these medications    clopidogrel 75 mg tablet  Commonly known as:  PLAVIX          No discharge procedures on file.  Follow-up Information     Beto Phelps MD.    Specialties:  Cardiology, INTERVENTIONAL CARDIOLOGY  Contact information:  [facility] [facility] [address]  [phone]

## 2018-06-08 NOTE — PLAN OF CARE
Problem: Patient Care Overview  Goal: Plan of Care Review  Outcome: Ongoing (interventions implemented as appropriate)  Pt received from cath lab via stretcher.Vs stable.No c/o pain or discomfort.Right wrist without hematoma,positive pulses and no bleeding.Post cath instructions given and verbalized understanding.Family at bedside and pt oriented to room

## 2018-06-08 NOTE — INTERVAL H&P NOTE
The patient has been examined and the H&P has been reviewed:    I concur with the findings and no changes have occurred since H&P was written.     LHC +/- PCI  L CFA Access  PRU pending  Will re-check frailty prior to d/c    he has undergone the following TAVR work-up:   ECHO (Date 5/25/2018): JOEY 0.84 cm2, AVAi 0.36 cm2/m2, peak aortic jet velocity 4.6 m/s,MG 58 mmHg, LVEF 65%  LHC (Date ): Needs   STS: 4.63%   Frailty: 3/4   Iliacs are >9.54 on L and > 9.47 on R   LVOT area by CTA is 4.86 cm2 and Avg Diameter is 24.9 (distance 29.0 mm x 21.6 mm) per Dr Phelps   CT Incidental Findings: Ankylosing Spondylitis   CTS risk assessment: Inoperable due to impaired mobility per Dr. Aburto   he is currently Cohort C, per Dr Phelps due to debility, impaired mobility   Rhythm Issues: Pending   6 MWT: 500 ft (in 3:39)  PFTs: moderate FEV1 56% predicted, FVC 60% predicted, MVV 38% predicted, DLCO 102% predicted    If able to pre-hab may be a candidate for 26mm S3 (7% OS) via RTF access.     Anesthesia/Surgery risks, benefits and alternative options discussed and understood by patient/family.          There are no hospital problems to display for this patient.

## 2018-06-08 NOTE — PROGRESS NOTES
"Dr. Burleson notified of patient eating a "cert" at 0630 am and the potassium = 2.6 this am as reported by lab. Awaiting orders.  "

## 2018-06-11 DIAGNOSIS — I35.0 NODULAR CALCIFIC AORTIC VALVE STENOSIS: Primary | ICD-10-CM

## 2018-06-11 DIAGNOSIS — N18.9 CHRONIC KIDNEY DISEASE, UNSPECIFIED CKD STAGE: ICD-10-CM

## 2018-06-11 RX ORDER — DIPHENHYDRAMINE HCL 25 MG
50 CAPSULE ORAL ONCE
Status: CANCELLED | OUTPATIENT
Start: 2018-06-11 | End: 2018-06-11

## 2018-06-11 RX ORDER — DEXTROSE MONOHYDRATE AND SODIUM CHLORIDE 5; .45 G/100ML; G/100ML
INJECTION, SOLUTION INTRAVENOUS CONTINUOUS
Status: CANCELLED | OUTPATIENT
Start: 2018-06-11

## 2018-07-23 ENCOUNTER — DOCUMENTATION ONLY (OUTPATIENT)
Dept: CARDIOTHORACIC SURGERY | Facility: HOSPITAL | Age: 78
End: 2018-07-23

## 2018-07-23 NOTE — PROGRESS NOTES
"  Mr. Wilcox is a 78 year old gentleman referred by Dr. Rojo for evaluation of Aortic Stenosis. He has a PMHx of chronic hip and current back pain (on tramodol multiple times a day) walks with a walker, ankylosing spondylitis, HTN, HLD, chronic diastolic heart failure, GERD. He cannot perform his ADLs and has assistance by family. He recalls being told about his Aortic Stenosis about a year ago and his SOB/AMAYA has worsened over the past 6 months. He did have rheumatic fever as a child at age 6. He believes that he was told he "had a hole in his heart" but he did not have a bubble study today. He gets dyspneic ambulating 50ft with his walker to the mailbox. He denies chest pain, light headedness, syncope. He spends his days in a chair and only ambulates with his walker to get the mail. His SOB is somewhat alleviated with Lasix. He has orthopnea for which he sleeps in a recliner. He does drink 2+ cokes a day. He has never had a coronary angiogram.      Saumya Wilcox is a 78 y.o. male referred by Dr Rojo for evaluation of severe AS (NYHA Class IV sx).     he has undergone the following TAVR work-up:   · ECHO (Date 5/25/2018): JOEY 0.84 cm2, AVAi 0.36 cm2/m2, peak aortic jet velocity 4.6 m/s,MG 58 mmHg, LVEF 65%  · LHC (Date ): Needs   · STS: 4.63%   · Frailty: 3/4   · Iliacs are >9.54 on L and > 9.47 on R   · LVOT area by CTA is 4.86 cm2 and Avg Diameter is 24.9 (distance 29.0 mm x 21.6 mm) per Dr Phelps   · CT Incidental Findings: Ankylosing Spondylitis      · Rhythm Issues: Pending   · 6 MWT: 500 ft (in 3:39)  · PFTs: moderate FEV1 56% predicted, FVC 60% predicted, MVV 38% predicted, DLCO 102% predicted     If able to pre-hab may be a candidate for 26mm S3 (7% OS) via RTF access.         Review of Systems   Constitution: Positive for weakness. Negative for chills, diaphoresis, fever, weight gain and weight loss.   HENT: Negative for sore throat.    Eyes: Negative for blurred vision, vision loss in left eye, " vision loss in right eye and visual disturbance.   Cardiovascular: Positive for dyspnea on exertion and orthopnea. Negative for chest pain, claudication, leg swelling, near-syncope, palpitations, paroxysmal nocturnal dyspnea and syncope.   Respiratory: Positive for shortness of breath. Negative for cough, hemoptysis, sputum production and wheezing.    Endocrine: Negative for cold intolerance and heat intolerance.   Hematologic/Lymphatic: Negative for adenopathy. Does not bruise/bleed easily.   Skin: Negative for rash.   Musculoskeletal: Positive for joint pain and muscle weakness. Negative for falls and myalgias.   Gastrointestinal: Negative for abdominal pain, change in bowel habit, constipation, diarrhea, melena and nausea.   Genitourinary: Positive for bladder incontinence.   Neurological: Negative for dizziness, focal weakness, headaches, light-headedness and numbness.   Psychiatric/Behavioral: Negative for altered mental status.         History reviewed. No pertinent past medical history.              Current Outpatient Prescriptions on File Prior to Visit   Medication Sig Dispense Refill    atenolol-chlorthalidone (TENORETIC) 50-25 mg Tab          furosemide (LASIX) 20 MG tablet          furosemide (LASIX) 40 MG tablet          mupirocin (BACTROBAN) 2 % ointment          polyethylene glycol (GLYCOLAX) 17 gram/dose powder          potassium chloride SA (K-DUR,KLOR-CON) 20 MEQ tablet          simvastatin (ZOCOR) 40 MG tablet          traMADol (ULTRAM) 50 mg tablet                              Current Facility-Administered Medications on File Prior to Visit   Medication Dose Route Frequency Provider Last Rate Last Dose    [COMPLETED] omnipaque 350 iohexol 100 mL  100 mL Intravenous ONCE PRN Beto Phelps MD   100 mL at 05/28/18 1035      Vitals           Vitals:     05/28/18 1438 05/28/18 1442   BP: 125/64 119/60   BP Location: Left arm Right arm   Patient Position: Sitting Sitting   BP Method: Large  "(Automatic) Large (Automatic)   Pulse: 86 84   SpO2: 97%     Weight: 107 kg (235 lb 14.3 oz)     Height: 6' 6" (1.981 m)           Body mass index is 27.26 kg/m².        Objective:    Physical Exam   Constitutional: He is oriented to person, place, and time. He appears well-developed and well-nourished.   HENT:   Head: Normocephalic and atraumatic.   Eyes: EOM are normal. Pupils are equal, round, and reactive to light.   Neck: Neck supple. JVD present. No tracheal deviation present. No thyromegaly present.   Cardiovascular: Normal rate, regular rhythm, S1 normal, S2 normal, intact distal pulses and normal pulses.  PMI is not displaced.  Exam reveals no gallop and no friction rub.    Murmur heard.  Pulmonary/Chest: Effort normal and breath sounds normal. No respiratory distress. He has no wheezes. He has no rales. He exhibits no tenderness.   Abdominal: Soft. Bowel sounds are normal. He exhibits distension. He exhibits no mass. There is no hepatosplenomegaly. There is no tenderness.   Musculoskeletal: Normal range of motion. He exhibits edema. He exhibits no tenderness.   Neurological: He is alert and oriented to person, place, and time.   Skin: Skin is warm and dry. No rash noted.   Psychiatric: He has a normal mood and affect. His behavior is normal.         Assessment:    Saumya Wilcox is a 78 y.o. male referred by Dr Rojo for evaluation of severe AS (NYHA Class IV sx).        Plan:      Pt is Inoperable due to impaired mobility and severe debility, currently cohort C for TAVR and will need prehab.     CTS Attending Note:    I agree with the NP's note as stated above.     "

## 2018-07-25 ENCOUNTER — OFFICE VISIT (OUTPATIENT)
Dept: CARDIOLOGY | Facility: CLINIC | Age: 78
DRG: 266 | End: 2018-07-25
Payer: MEDICARE

## 2018-07-25 ENCOUNTER — ANESTHESIA EVENT (OUTPATIENT)
Dept: MEDSURG UNIT | Facility: HOSPITAL | Age: 78
DRG: 266 | End: 2018-07-25
Payer: MEDICARE

## 2018-07-25 VITALS
BODY MASS INDEX: 26.79 KG/M2 | WEIGHT: 231.5 LBS | HEIGHT: 78 IN | SYSTOLIC BLOOD PRESSURE: 112 MMHG | OXYGEN SATURATION: 94 % | DIASTOLIC BLOOD PRESSURE: 49 MMHG | HEART RATE: 111 BPM

## 2018-07-25 DIAGNOSIS — R35.0 BENIGN PROSTATIC HYPERPLASIA WITH URINARY FREQUENCY: ICD-10-CM

## 2018-07-25 DIAGNOSIS — K21.9 GASTROESOPHAGEAL REFLUX DISEASE WITHOUT ESOPHAGITIS: ICD-10-CM

## 2018-07-25 DIAGNOSIS — E78.49 OTHER HYPERLIPIDEMIA: ICD-10-CM

## 2018-07-25 DIAGNOSIS — I35.0 NODULAR CALCIFIC AORTIC VALVE STENOSIS: Primary | ICD-10-CM

## 2018-07-25 DIAGNOSIS — N40.1 BENIGN PROSTATIC HYPERPLASIA WITH URINARY FREQUENCY: ICD-10-CM

## 2018-07-25 DIAGNOSIS — M45.9 ANKYLOSING SPONDYLITIS, UNSPECIFIED SITE OF SPINE: ICD-10-CM

## 2018-07-25 DIAGNOSIS — I10 HYPERTENSION, UNSPECIFIED TYPE: ICD-10-CM

## 2018-07-25 PROCEDURE — 99215 OFFICE O/P EST HI 40 MIN: CPT | Mod: S$GLB,,, | Performed by: INTERNAL MEDICINE

## 2018-07-25 PROCEDURE — 3078F DIAST BP <80 MM HG: CPT | Mod: CPTII,S$GLB,, | Performed by: INTERNAL MEDICINE

## 2018-07-25 PROCEDURE — 99999 PR PBB SHADOW E&M-EST. PATIENT-LVL III: CPT | Mod: PBBFAC,,,

## 2018-07-25 PROCEDURE — 3074F SYST BP LT 130 MM HG: CPT | Mod: CPTII,S$GLB,, | Performed by: INTERNAL MEDICINE

## 2018-07-25 NOTE — PROGRESS NOTES
Subjective:    Patient ID:  Saumya Wilcox is a 78 y.o. male who presents for evaluation of Aortic stenosis.        Referring Physician: Dr. Rojo ([place])    HPI    Mr. Wilcox is a 78 year old gentleman initially referred by Dr. Rojo for evaluation of Aortic Stenosis here for follow up. He was deemed Cohort C for TAVR in May, 2018 and recommended for rehab. Patient increased his exercise at home including swimming and here for follow up. He reports NYHA FC III symptoms now.      He has a PMHx of chronic hip and current back pain (on tramodol multiple times a day) walks with a walker, ankylosing spondylitis, HTN, HLD, chronic diastolic heart failure, GERD and BPH. He cannot perform his ADLs and has assistance by family. He gets dyspneic ambulating 50ft with his walker to the mailbox. He denies chest pain, light headedness, syncope. He spends his days in a chair and only ambulates with his walker to get the mail. His SOB is somewhat alleviated with Lasix. He has orthopnea for which he sleeps in a recliner.      Saumya Wilcox is a 78 y.o. male referred by Dr Rojo for evaluation of severe AS (NYHA Class III sx).    he has undergone the following TAVR work-up:   ECHO (Date 5/25/2018): JOEY 0.84 cm2, AVAi 0.36 cm2/m2, peak aortic jet velocity 4.6 m/s,MG 58 mmHg, LVEF 65%  LHC (Date 6/8/18 ): Non-dominant mRCA 99% disease. LCx and LAD non obstructive  STS: 4.63%   Frailty: 1/4  Iliacs are >9.54 on L and > 9.47 on R   LVOT area by CTA is 4.86 cm2 and Avg Diameter is 24.9 (distance 29.0 mm x 21.6 mm) per Dr Phelps   CT Incidental Findings: Ankylosing Spondylitis   CTS risk assessment: Inoperable due to impaired mobility per Dr. Aburto   Rhythm Issues: Pending   6 MWT: 500 ft (in 3:39)  PFTs: moderate FEV1 56% predicted, FVC 60% predicted, MVV 38% predicted, DLCO 102% predicted    He is a  candidate for 26mm S3 (7% OS) via RTF access.     Review of Systems   Constitution: Negative for chills, decreased  appetite, fever, weakness, night sweats, weight gain and weight loss.   HENT: Negative for congestion, hoarse voice, stridor and tinnitus.    Eyes: Negative for blurred vision, pain and visual disturbance.   Cardiovascular: Positive for dyspnea on exertion and orthopnea. Negative for chest pain, claudication, irregular heartbeat, leg swelling, near-syncope, palpitations, paroxysmal nocturnal dyspnea and syncope.   Respiratory: Negative for cough, hemoptysis, shortness of breath, snoring and wheezing.    Endocrine: Negative for cold intolerance, heat intolerance and polyuria.   Hematologic/Lymphatic: Negative for bleeding problem. Does not bruise/bleed easily.   Skin: Negative for color change and rash.   Musculoskeletal: Positive for arthritis, back pain and stiffness. Negative for joint pain, muscle cramps and myalgias.   Gastrointestinal: Negative for abdominal pain, anorexia, constipation, diarrhea, dysphagia, heartburn, hemorrhoids, melena, nausea and vomiting.   Genitourinary: Negative for frequency, hematuria, hesitancy, nocturia and urgency.   Neurological: Positive for focal weakness. Negative for difficulty with concentration, dizziness, headaches, light-headedness, numbness, seizures, tremors and vertigo.   Psychiatric/Behavioral: Negative for altered mental status and depression. The patient does not have insomnia.    Allergic/Immunologic: Negative for hives.       Objective:    Physical Exam   Constitutional: He is oriented to person, place, and time. He appears well-developed and well-nourished.   HENT:   Head: Normocephalic and atraumatic.   Eyes: Conjunctivae are normal. Pupils are equal, round, and reactive to light.   Neck: Normal range of motion. No JVD present. No tracheal deviation present. No thyromegaly present.   Cardiovascular: Regular rhythm, S1 normal, S2 normal, normal heart sounds, intact distal pulses and normal pulses.   No extrasystoles are present. Exam reveals no S3.    No murmur  heard.  Pulses:       Carotid pulses are 2+ on the right side, and 2+ on the left side.       Radial pulses are 2+ on the right side, and 2+ on the left side.        Femoral pulses are 2+ on the right side, and 2+ on the left side.       Dorsalis pedis pulses are 2+ on the right side, and 2+ on the left side.        Posterior tibial pulses are 2+ on the right side, and 2+ on the left side.   Late peaking systolic murmur    Pulmonary/Chest: Effort normal and breath sounds normal. No stridor. No respiratory distress. He has no wheezes. He has no rales.   Abdominal: Soft. Bowel sounds are normal. He exhibits no distension. There is no tenderness.   Musculoskeletal: He exhibits deformity. He exhibits no edema or tenderness.   Neurological: He is alert and oriented to person, place, and time.   Skin: Skin is warm and dry. He is not diaphoretic. No erythema.   Psychiatric: He has a normal mood and affect.         Assessment:       1. Severe - calcific aortic valve stenosis - Symptomatic with NYHA FC III    he has undergone the following TAVR work-up:   ECHO (Date 5/25/2018): JOEY 0.84 cm2,  peak aortic jet velocity 4.6 m/s,MG 58 mmHg, LVEF 65%  LHC (Date 6/8/18 ): Non-dominant mRCA 99% disease. LCx and LAD non obstructive  STS: 4.63%   Frailty: 1/4  Iliacs are >9.54 on L and > 9.47 on R   LVOT area by CTA is 4.86 cm2 and Avg Diameter is 24.9 (distance 29.0 mm x 21.6 mm) per Dr Phelps   CT Incidental Findings: Ankylosing Spondylitis   CTS risk assessment: Inoperable due to impaired mobility per Dr. Aburto   Rhythm Issues: Pending   6 MWT: 500 ft (in 3:39)  PFTs: moderate FEV1 56% predicted, FVC 60% predicted, MVV 38% predicted, DLCO 102% predicted    He is a  candidate for 26mm S3 (7% OS) via RTF access.     2. Hypertension, unspecified type - controlled    3. Other hyperlipidemia - on statin therapy   4. Ankylosing spondylitis, unspecified site of spine    5. Gastroesophageal reflux disease without esophagitis    6.      BPH  with overflow incontinence.      Plan:     Transcatheter Aortic valve replacement   1. Valve: 26 mm S3  (7% oversized)  2. TAVR access: RTF  3. Valvuloplasty balloon: 22 mm true  4. Viabahn size if needed: 10 x 5  5. Antithrombotic therapy: ASA and plavix  6. Pacemaker risk factors:   1. The patient was explained the the procedure carries around a 12.5% risk of permanent pacemaker requirement and that risk depends on the patients underlying conduction system.  7. Patient was educated abut the pathophysiology and natural history of severe aortic stenosis and was educated about the treatment options including surgical and transcatheter valve replacement. She agrees to be full code for the forseable future and to undergo workup for treatment of severe AS.   8. The risks, benefits, and alternatives of transcatheter aortic valve replacement were discussed with the patient. All questions were answered and informed consent was obtained. I had a detailed discussion with the patient regarding risk of stroke, MI, bleeding access site complications including limb loss, allergy, kidney failure including dialysis and death.  9. The patient understands the risks and benefits and wishes to go ahead with the procedure.  10. All patient's questions were answered      Andrea Vitale MD  PGY-8  Interventional Cardiology Fellow     Staff:   I have personally taken the history and examined this patient and agree with the fellow's note as stated above and amended it accordingly :-) The patient has pre-habbed well and will undergo TAVR tomorrow.  Esa thinks TF will be possible after he is sedated and relaxed.

## 2018-07-26 ENCOUNTER — ANESTHESIA (OUTPATIENT)
Dept: MEDSURG UNIT | Facility: HOSPITAL | Age: 78
DRG: 266 | End: 2018-07-26
Payer: MEDICARE

## 2018-07-26 ENCOUNTER — SURGERY (OUTPATIENT)
Age: 78
End: 2018-07-26

## 2018-07-26 ENCOUNTER — HOSPITAL ENCOUNTER (INPATIENT)
Facility: HOSPITAL | Age: 78
LOS: 1 days | Discharge: HOME | DRG: 266 | End: 2018-07-27
Attending: INTERNAL MEDICINE | Admitting: INTERNAL MEDICINE
Payer: MEDICARE

## 2018-07-26 DIAGNOSIS — I10 HYPERTENSION, UNSPECIFIED TYPE: ICD-10-CM

## 2018-07-26 DIAGNOSIS — I44.30 ATRIOVENTRICULAR BLOCK: ICD-10-CM

## 2018-07-26 DIAGNOSIS — I35.0 AORTIC STENOSIS: ICD-10-CM

## 2018-07-26 DIAGNOSIS — I49.9 ABNORMAL HEART RHYTHM: ICD-10-CM

## 2018-07-26 DIAGNOSIS — I35.0 NODULAR CALCIFIC AORTIC VALVE STENOSIS: Primary | ICD-10-CM

## 2018-07-26 DIAGNOSIS — R00.0 TACHYCARDIA: ICD-10-CM

## 2018-07-26 DIAGNOSIS — Z95.2 S/P TAVR (TRANSCATHETER AORTIC VALVE REPLACEMENT): ICD-10-CM

## 2018-07-26 LAB
ABO + RH BLD: NORMAL
ANION GAP SERPL CALC-SCNC: 17 MMOL/L
ANION GAP SERPL CALC-SCNC: 8 MMOL/L
BASOPHILS # BLD AUTO: 0.04 K/UL
BASOPHILS NFR BLD: 0.3 %
BLD GP AB SCN CELLS X3 SERPL QL: NORMAL
BUN SERPL-MCNC: 23 MG/DL
BUN SERPL-MCNC: 23 MG/DL
CALCIUM SERPL-MCNC: 11 MG/DL
CALCIUM SERPL-MCNC: 9 MG/DL
CHLORIDE SERPL-SCNC: 92 MMOL/L
CHLORIDE SERPL-SCNC: 97 MMOL/L
CO2 SERPL-SCNC: 34 MMOL/L
CO2 SERPL-SCNC: 35 MMOL/L
CREAT SERPL-MCNC: 1 MG/DL
CREAT SERPL-MCNC: 1.1 MG/DL
DIFFERENTIAL METHOD: ABNORMAL
EOSINOPHIL # BLD AUTO: 0.1 K/UL
EOSINOPHIL NFR BLD: 0.5 %
ERYTHROCYTE [DISTWIDTH] IN BLOOD BY AUTOMATED COUNT: 12.7 %
EST. GFR  (AFRICAN AMERICAN): >60 ML/MIN/1.73 M^2
EST. GFR  (AFRICAN AMERICAN): >60 ML/MIN/1.73 M^2
EST. GFR  (NON AFRICAN AMERICAN): >60 ML/MIN/1.73 M^2
EST. GFR  (NON AFRICAN AMERICAN): >60 ML/MIN/1.73 M^2
GLUCOSE SERPL-MCNC: 125 MG/DL
GLUCOSE SERPL-MCNC: 156 MG/DL
HCT VFR BLD AUTO: 45.9 %
HGB BLD-MCNC: 15.4 G/DL
IMM GRANULOCYTES # BLD AUTO: 0.05 K/UL
IMM GRANULOCYTES NFR BLD AUTO: 0.4 %
LYMPHOCYTES # BLD AUTO: 2.3 K/UL
LYMPHOCYTES NFR BLD: 17.6 %
MAGNESIUM SERPL-MCNC: 2.1 MG/DL
MCH RBC QN AUTO: 33.7 PG
MCHC RBC AUTO-ENTMCNC: 33.6 G/DL
MCV RBC AUTO: 100 FL
MONOCYTES # BLD AUTO: 0.8 K/UL
MONOCYTES NFR BLD: 6 %
NEUTROPHILS # BLD AUTO: 9.7 K/UL
NEUTROPHILS NFR BLD: 75.2 %
NRBC BLD-RTO: 0 /100 WBC
PLATELET # BLD AUTO: 292 K/UL
PMV BLD AUTO: 9.6 FL
POTASSIUM SERPL-SCNC: 2.6 MMOL/L
POTASSIUM SERPL-SCNC: 2.7 MMOL/L
RBC # BLD AUTO: 4.57 M/UL
SODIUM SERPL-SCNC: 139 MMOL/L
SODIUM SERPL-SCNC: 144 MMOL/L
WBC # BLD AUTO: 12.89 K/UL

## 2018-07-26 PROCEDURE — 36415 COLL VENOUS BLD VENIPUNCTURE: CPT

## 2018-07-26 PROCEDURE — 63600175 PHARM REV CODE 636 W HCPCS

## 2018-07-26 PROCEDURE — 37000008 HC ANESTHESIA 1ST 15 MINUTES: Performed by: INTERNAL MEDICINE

## 2018-07-26 PROCEDURE — 80048 BASIC METABOLIC PNL TOTAL CA: CPT

## 2018-07-26 PROCEDURE — 25000003 PHARM REV CODE 250: Performed by: STUDENT IN AN ORGANIZED HEALTH CARE EDUCATION/TRAINING PROGRAM

## 2018-07-26 PROCEDURE — 33361 REPLACE AORTIC VALVE PERQ: CPT | Mod: 62,Q0,, | Performed by: THORACIC SURGERY (CARDIOTHORACIC VASCULAR SURGERY)

## 2018-07-26 PROCEDURE — 25000003 PHARM REV CODE 250: Performed by: INTERNAL MEDICINE

## 2018-07-26 PROCEDURE — 93010 ELECTROCARDIOGRAM REPORT: CPT | Mod: ,,, | Performed by: INTERNAL MEDICINE

## 2018-07-26 PROCEDURE — 27000191 HC C-V MONITORING

## 2018-07-26 PROCEDURE — 33210 INSERT ELECTRD/PM CATH SNGL: CPT | Mod: 59,Q0,, | Performed by: ANESTHESIOLOGY

## 2018-07-26 PROCEDURE — 93010 ELECTROCARDIOGRAM REPORT: CPT | Mod: 76,,, | Performed by: INTERNAL MEDICINE

## 2018-07-26 PROCEDURE — 37000009 HC ANESTHESIA EA ADD 15 MINS: Performed by: INTERNAL MEDICINE

## 2018-07-26 PROCEDURE — 27800505 HC CATH, RADIAL ARTERY KIT: Performed by: STUDENT IN AN ORGANIZED HEALTH CARE EDUCATION/TRAINING PROGRAM

## 2018-07-26 PROCEDURE — 36620 INSERTION CATHETER ARTERY: CPT | Mod: 59,Q0,, | Performed by: ANESTHESIOLOGY

## 2018-07-26 PROCEDURE — 25000003 PHARM REV CODE 250: Performed by: PHYSICIAN ASSISTANT

## 2018-07-26 PROCEDURE — 20000000 HC ICU ROOM

## 2018-07-26 PROCEDURE — C1751 CATH, INF, PER/CENT/MIDLINE: HCPCS | Performed by: STUDENT IN AN ORGANIZED HEALTH CARE EDUCATION/TRAINING PROGRAM

## 2018-07-26 PROCEDURE — 36100702 ANESTHESIA SWAN GANZ LINE: Performed by: ANESTHESIOLOGY

## 2018-07-26 PROCEDURE — 85025 COMPLETE CBC W/AUTO DIFF WBC: CPT

## 2018-07-26 PROCEDURE — A4216 STERILE WATER/SALINE, 10 ML: HCPCS | Performed by: ANESTHESIOLOGY

## 2018-07-26 PROCEDURE — 99233 SBSQ HOSP IP/OBS HIGH 50: CPT | Mod: ,,, | Performed by: INTERNAL MEDICINE

## 2018-07-26 PROCEDURE — 33361 REPLACE AORTIC VALVE PERQ: CPT

## 2018-07-26 PROCEDURE — 02RF38Z REPLACEMENT OF AORTIC VALVE WITH ZOOPLASTIC TISSUE, PERCUTANEOUS APPROACH: ICD-10-PCS | Performed by: INTERNAL MEDICINE

## 2018-07-26 PROCEDURE — 63600175 PHARM REV CODE 636 W HCPCS: Performed by: ANESTHESIOLOGY

## 2018-07-26 PROCEDURE — 27200676 HC TRANSDUCER MONITOR KIT DOUBLE: Performed by: STUDENT IN AN ORGANIZED HEALTH CARE EDUCATION/TRAINING PROGRAM

## 2018-07-26 PROCEDURE — 27201647 CATH LAB PROCEDURE

## 2018-07-26 PROCEDURE — 25000003 PHARM REV CODE 250: Performed by: ANESTHESIOLOGY

## 2018-07-26 PROCEDURE — 25000003 PHARM REV CODE 250

## 2018-07-26 PROCEDURE — 63600175 PHARM REV CODE 636 W HCPCS: Performed by: INTERNAL MEDICINE

## 2018-07-26 PROCEDURE — 94761 N-INVAS EAR/PLS OXIMETRY MLT: CPT

## 2018-07-26 PROCEDURE — 33361 REPLACE AORTIC VALVE PERQ: CPT | Mod: 62,Q0,, | Performed by: INTERNAL MEDICINE

## 2018-07-26 PROCEDURE — 80048 BASIC METABOLIC PNL TOTAL CA: CPT | Mod: 91

## 2018-07-26 PROCEDURE — 93005 ELECTROCARDIOGRAM TRACING: CPT

## 2018-07-26 PROCEDURE — 27000239 HC STAND-BY BYPASS PUMP

## 2018-07-26 PROCEDURE — 83735 ASSAY OF MAGNESIUM: CPT

## 2018-07-26 PROCEDURE — D9220A PRA ANESTHESIA: Mod: Q0,,, | Performed by: ANESTHESIOLOGY

## 2018-07-26 PROCEDURE — 27000221 HC OXYGEN, UP TO 24 HOURS

## 2018-07-26 PROCEDURE — 27100025 HC TUBING, SET FLUID WARMER: Performed by: STUDENT IN AN ORGANIZED HEALTH CARE EDUCATION/TRAINING PROGRAM

## 2018-07-26 PROCEDURE — 86850 RBC ANTIBODY SCREEN: CPT

## 2018-07-26 RX ORDER — LANOLIN ALCOHOL/MO/W.PET/CERES
800 CREAM (GRAM) TOPICAL
Status: DISCONTINUED | OUTPATIENT
Start: 2018-07-26 | End: 2018-07-27 | Stop reason: HOSPADM

## 2018-07-26 RX ORDER — POTASSIUM CHLORIDE 20 MEQ/15ML
40 SOLUTION ORAL
Status: DISCONTINUED | OUTPATIENT
Start: 2018-07-26 | End: 2018-07-26

## 2018-07-26 RX ORDER — POTASSIUM CHLORIDE 20 MEQ/1
20 TABLET, EXTENDED RELEASE ORAL
Status: DISCONTINUED | OUTPATIENT
Start: 2018-07-26 | End: 2018-07-27 | Stop reason: HOSPADM

## 2018-07-26 RX ORDER — CEFAZOLIN SODIUM 1 G/3ML
INJECTION, POWDER, FOR SOLUTION INTRAMUSCULAR; INTRAVENOUS
Status: DISCONTINUED | OUTPATIENT
Start: 2018-07-26 | End: 2018-07-26

## 2018-07-26 RX ORDER — DEXTROSE MONOHYDRATE, SODIUM CHLORIDE, AND POTASSIUM CHLORIDE 50; 2.98; 4.5 G/1000ML; G/1000ML; G/1000ML
INJECTION, SOLUTION INTRAVENOUS CONTINUOUS
Status: DISCONTINUED | OUTPATIENT
Start: 2018-07-26 | End: 2018-07-27

## 2018-07-26 RX ORDER — POTASSIUM CHLORIDE 20 MEQ/1
60 TABLET, EXTENDED RELEASE ORAL ONCE
Status: COMPLETED | OUTPATIENT
Start: 2018-07-26 | End: 2018-07-26

## 2018-07-26 RX ORDER — DEXMEDETOMIDINE HYDROCHLORIDE 100 UG/ML
INJECTION, SOLUTION INTRAVENOUS
Status: DISCONTINUED | OUTPATIENT
Start: 2018-07-26 | End: 2018-07-26

## 2018-07-26 RX ORDER — POTASSIUM CHLORIDE 14.9 MG/ML
INJECTION INTRAVENOUS CONTINUOUS PRN
Status: DISCONTINUED | OUTPATIENT
Start: 2018-07-26 | End: 2018-07-26

## 2018-07-26 RX ORDER — DIPHENHYDRAMINE HYDROCHLORIDE 50 MG/ML
INJECTION INTRAMUSCULAR; INTRAVENOUS
Status: DISCONTINUED | OUTPATIENT
Start: 2018-07-26 | End: 2018-07-26

## 2018-07-26 RX ORDER — FENTANYL CITRATE 50 UG/ML
INJECTION, SOLUTION INTRAMUSCULAR; INTRAVENOUS
Status: DISCONTINUED | OUTPATIENT
Start: 2018-07-26 | End: 2018-07-26

## 2018-07-26 RX ORDER — ONDANSETRON 2 MG/ML
4 INJECTION INTRAMUSCULAR; INTRAVENOUS EVERY 12 HOURS PRN
Status: DISCONTINUED | OUTPATIENT
Start: 2018-07-26 | End: 2018-07-27 | Stop reason: HOSPADM

## 2018-07-26 RX ORDER — PHENYLEPHRINE HCL IN 0.9% NACL 1 MG/10 ML
SYRINGE (ML) INTRAVENOUS
Status: DISCONTINUED | OUTPATIENT
Start: 2018-07-26 | End: 2018-07-26

## 2018-07-26 RX ORDER — HEPARIN SODIUM 1000 [USP'U]/ML
INJECTION, SOLUTION INTRAVENOUS; SUBCUTANEOUS
Status: DISCONTINUED | OUTPATIENT
Start: 2018-07-26 | End: 2018-07-26

## 2018-07-26 RX ORDER — ASPIRIN 81 MG/1
81 TABLET ORAL DAILY
Status: DISCONTINUED | OUTPATIENT
Start: 2018-07-27 | End: 2018-07-27 | Stop reason: HOSPADM

## 2018-07-26 RX ORDER — LABETALOL HYDROCHLORIDE 5 MG/ML
10 INJECTION, SOLUTION INTRAVENOUS EVERY 4 HOURS PRN
Status: DISCONTINUED | OUTPATIENT
Start: 2018-07-26 | End: 2018-07-27 | Stop reason: HOSPADM

## 2018-07-26 RX ORDER — DEXTROSE MONOHYDRATE AND SODIUM CHLORIDE 5; .45 G/100ML; G/100ML
INJECTION, SOLUTION INTRAVENOUS CONTINUOUS
Status: DISCONTINUED | OUTPATIENT
Start: 2018-07-26 | End: 2018-07-26

## 2018-07-26 RX ORDER — DIPHENHYDRAMINE HCL 50 MG
50 CAPSULE ORAL ONCE
Status: COMPLETED | OUTPATIENT
Start: 2018-07-26 | End: 2018-07-26

## 2018-07-26 RX ORDER — ACETAMINOPHEN 325 MG/1
650 TABLET ORAL EVERY 4 HOURS PRN
Status: DISCONTINUED | OUTPATIENT
Start: 2018-07-26 | End: 2018-07-27 | Stop reason: HOSPADM

## 2018-07-26 RX ORDER — POTASSIUM CHLORIDE 20 MEQ/15ML
60 SOLUTION ORAL
Status: DISCONTINUED | OUTPATIENT
Start: 2018-07-26 | End: 2018-07-26

## 2018-07-26 RX ORDER — NOREPINEPHRINE BITARTRATE/D5W 4MG/250ML
0.02 PLASTIC BAG, INJECTION (ML) INTRAVENOUS CONTINUOUS
Status: DISCONTINUED | OUTPATIENT
Start: 2018-07-26 | End: 2018-07-27 | Stop reason: HOSPADM

## 2018-07-26 RX ORDER — ERYTHROMYCIN 5 MG/G
OINTMENT OPHTHALMIC 3 TIMES DAILY
Status: DISCONTINUED | OUTPATIENT
Start: 2018-07-26 | End: 2018-07-27 | Stop reason: HOSPADM

## 2018-07-26 RX ORDER — TRAMADOL HYDROCHLORIDE 50 MG/1
50 TABLET ORAL EVERY 4 HOURS PRN
Status: DISCONTINUED | OUTPATIENT
Start: 2018-07-26 | End: 2018-07-27 | Stop reason: HOSPADM

## 2018-07-26 RX ORDER — PROTAMINE SULFATE 10 MG/ML
INJECTION, SOLUTION INTRAVENOUS
Status: DISCONTINUED | OUTPATIENT
Start: 2018-07-26 | End: 2018-07-26

## 2018-07-26 RX ORDER — POTASSIUM CHLORIDE 20 MEQ/15ML
40 SOLUTION ORAL ONCE
Status: DISCONTINUED | OUTPATIENT
Start: 2018-07-26 | End: 2018-07-27 | Stop reason: HOSPADM

## 2018-07-26 RX ORDER — SIMVASTATIN 10 MG/1
40 TABLET, FILM COATED ORAL NIGHTLY
Status: DISCONTINUED | OUTPATIENT
Start: 2018-07-26 | End: 2018-07-27 | Stop reason: HOSPADM

## 2018-07-26 RX ORDER — NOREPINEPHRINE BITARTRATE/D5W 4MG/250ML
PLASTIC BAG, INJECTION (ML) INTRAVENOUS CONTINUOUS PRN
Status: DISCONTINUED | OUTPATIENT
Start: 2018-07-26 | End: 2018-07-26

## 2018-07-26 RX ORDER — CEFAZOLIN SODIUM 1 G/3ML
1 INJECTION, POWDER, FOR SOLUTION INTRAMUSCULAR; INTRAVENOUS
Status: COMPLETED | OUTPATIENT
Start: 2018-07-26 | End: 2018-07-27

## 2018-07-26 RX ORDER — PANTOPRAZOLE SODIUM 40 MG/1
40 TABLET, DELAYED RELEASE ORAL DAILY
Status: DISCONTINUED | OUTPATIENT
Start: 2018-07-26 | End: 2018-07-27 | Stop reason: HOSPADM

## 2018-07-26 RX ADMIN — CEFAZOLIN 2 G: 1 INJECTION, POWDER, FOR SOLUTION INTRAMUSCULAR; INTRAVENOUS at 12:07

## 2018-07-26 RX ADMIN — DIPHENHYDRAMINE HYDROCHLORIDE 50 MG: 50 CAPSULE ORAL at 09:07

## 2018-07-26 RX ADMIN — DIPHENHYDRAMINE HYDROCHLORIDE 12.5 MG: 50 INJECTION, SOLUTION INTRAMUSCULAR; INTRAVENOUS at 12:07

## 2018-07-26 RX ADMIN — DEXMEDETOMIDINE HYDROCHLORIDE 105 MCG: 100 INJECTION, SOLUTION, CONCENTRATE INTRAVENOUS at 12:07

## 2018-07-26 RX ADMIN — PROTAMINE SULFATE 50 MG: 10 INJECTION, SOLUTION INTRAVENOUS at 01:07

## 2018-07-26 RX ADMIN — FENTANYL CITRATE 50 MCG: 50 INJECTION INTRAMUSCULAR; INTRAVENOUS at 12:07

## 2018-07-26 RX ADMIN — Medication 200 MCG: at 01:07

## 2018-07-26 RX ADMIN — DEXTROSE MONOHYDRATE, SODIUM CHLORIDE, AND POTASSIUM CHLORIDE: 50; 4.5; 2.98 INJECTION, SOLUTION INTRAVENOUS at 02:07

## 2018-07-26 RX ADMIN — Medication 200 MCG: at 12:07

## 2018-07-26 RX ADMIN — FENTANYL CITRATE 100 MCG: 50 INJECTION INTRAMUSCULAR; INTRAVENOUS at 01:07

## 2018-07-26 RX ADMIN — POTASSIUM CHLORIDE 60 MEQ: 1500 TABLET, EXTENDED RELEASE ORAL at 10:07

## 2018-07-26 RX ADMIN — CEFAZOLIN 1 G: 1 INJECTION, POWDER, FOR SOLUTION INTRAMUSCULAR; INTRAVENOUS at 04:07

## 2018-07-26 RX ADMIN — HYPROMELLOSE 2910 2 DROP: 5 SOLUTION OPHTHALMIC at 09:07

## 2018-07-26 RX ADMIN — HEPARIN SODIUM 10000 UNITS: 1000 INJECTION, SOLUTION INTRAVENOUS; SUBCUTANEOUS at 01:07

## 2018-07-26 RX ADMIN — POTASSIUM CHLORIDE: 200 INJECTION, SOLUTION INTRAVENOUS at 12:07

## 2018-07-26 RX ADMIN — SODIUM CHLORIDE, SODIUM GLUCONATE, SODIUM ACETATE, POTASSIUM CHLORIDE, MAGNESIUM CHLORIDE, SODIUM PHOSPHATE, DIBASIC, AND POTASSIUM PHOSPHATE: .53; .5; .37; .037; .03; .012; .00082 INJECTION, SOLUTION INTRAVENOUS at 11:07

## 2018-07-26 RX ADMIN — Medication 0.02 MCG/KG/MIN: at 12:07

## 2018-07-26 RX ADMIN — Medication 100 MCG: at 12:07

## 2018-07-26 RX ADMIN — SIMVASTATIN 40 MG: 10 TABLET, FILM COATED ORAL at 08:07

## 2018-07-26 RX ADMIN — TRAMADOL HYDROCHLORIDE 50 MG: 50 TABLET, FILM COATED ORAL at 04:07

## 2018-07-26 RX ADMIN — DEXMEDETOMIDINE HYDROCHLORIDE 1 MCG/KG/HR: 100 INJECTION, SOLUTION, CONCENTRATE INTRAVENOUS at 12:07

## 2018-07-26 RX ADMIN — PANTOPRAZOLE SODIUM 40 MG: 40 TABLET, DELAYED RELEASE ORAL at 04:07

## 2018-07-26 NOTE — CONSULTS
[facility]  Cardiac Electrophysiology  Consult Note    Admission Date: 7/26/2018  Code Status: Full Code   Attending Provider: Beto Phelps MD  Consulting Provider: Brook Pedroza MD  Principal Problem:Aortic stenosis    Inpatient consult to Electrophysiology  Consult performed by: BROOK PEDROZA  Consult ordered by: JOSEY CARDOZO        Subjective:     Chief Complaint:  S/p TAVR     HPI:   78 y.o.  severe AS, now s/p TAVR, EP consulted per protocol. Has pre-existing RBBB and LAFB , . TVP VVI 40 not pacer dependent, threshold is 0.8. Telemetry shows NSR with very brief paroxysms of SVT.     ECHO (Date 5/25/2018): JOEY 0.84 cm2, AVAi 0.36 cm2/m2, peak aortic jet velocity 4.6 m/s,MG 58 mmHg, LVEF 65%  LHC (Date 6/8/18 ): Non-dominant mRCA 99% disease. LCx and LAD non obstructive  Other hx of HLD, GERD, HFpEF, HTN    Past Medical History:   Diagnosis Date    Arthritis     CHF (congestive heart failure)     Hypertension        Past Surgical History:   Procedure Laterality Date    LEFT HEART CATHETERIZATION Left 6/8/2018    Procedure: Left heart cath;  Surgeon: Beto Phelps MD;  Location: [facility] CATH LAB;  Service: Cardiology;  Laterality: Left;    TONSILLECTOMY         Review of patient's allergies indicates:   Allergen Reactions    Contac [ohs-cyb-mo-acetaminophen]        No current facility-administered medications on file prior to encounter.      Current Outpatient Prescriptions on File Prior to Encounter   Medication Sig    acetaminophen (TYLENOL) 500 MG tablet Take 500 mg by mouth.    aspirin (ECOTRIN) 81 MG EC tablet Take 81 mg by mouth.    atenolol-chlorthalidone (TENORETIC) 50-25 mg Tab     chlorpheniramine/phenylephrine (ALLERFED COLD AND ALLERGY ORAL) Take 1 tablet by mouth once daily.    mupirocin (BACTROBAN) 2 % ointment     omeprazole (PRILOSEC) 20 MG capsule Take 20 mg by mouth.    polyethylene glycol (GLYCOLAX) 17 gram/dose powder      potassium chloride SA (K-DUR,KLOR-CON) 20 MEQ tablet     simvastatin (ZOCOR) 40 MG tablet     traMADol (ULTRAM) 50 mg tablet     furosemide (LASIX) 20 MG tablet     furosemide (LASIX) 40 MG tablet      Family History     None        Social History Main Topics    Smoking status: Never Smoker    Smokeless tobacco: Never Used    Alcohol use No    Drug use: No    Sexual activity: Not on file     Review of Systems   Unable to perform ROS: acuity of condition     Objective:     Vital Signs (Most Recent):  Temp: 97.7 °F (36.5 °C) (07/26/18 1415)  Pulse: (!) 59 (07/26/18 1430)  Resp: (!) 23 (07/26/18 1430)  BP: 105/71 (07/26/18 0805)  SpO2: 98 % (07/26/18 1430) Vital Signs (24h Range):  Temp:  [97.6 °F (36.4 °C)-97.7 °F (36.5 °C)] 97.7 °F (36.5 °C)  Pulse:  [] 59  Resp:  [20-23] 23  SpO2:  [96 %-98 %] 98 %  BP: (105-117)/(69-71) 105/71  Arterial Line BP: (112-114)/(47-49) 112/47       Weight: 104.8 kg (231 lb)  Body mass index is 29.66 kg/m².    SpO2: 98 %  O2 Device (Oxygen Therapy): Simple Face Mask    Physical Exam   Constitutional: He appears well-developed and well-nourished.   HENT:   Head: Normocephalic and atraumatic.   Cardiovascular: Normal rate and regular rhythm.    Pulmonary/Chest: Effort normal and breath sounds normal. No respiratory distress. He has no wheezes. He has no rales.   Musculoskeletal: He exhibits no edema.   Skin: Skin is warm.       Significant Labs: All pertinent lab results from the last 24 hours have been reviewed.    Significant Imaging: EKG: As in HPi              Assessment and Plan:     S/P TAVR (transcatheter aortic valve replacement)    77 y/o M s/p TAVR, pre-op RBBB with LAFB, nevertheless his native conduction is preserved. Also has some brief paroxysms of SVT likely Atrial Tachycardia.    -Agree with TVP  -NPO after midnight   -EKG in the morning   No heparin products please                Thank you for your consult. I will follow-up with patient. Please contact us if  you have any additional questions.    Brook Pedroza MD  Cardiac Electrophysiology  [facility]

## 2018-07-26 NOTE — ANESTHESIA PROCEDURE NOTES
[facility]urram Line    Diagnosis: Aortic stenosis  Procedure start time: 7/26/2018 12:20 PM  Timeout: 7/26/2018 12:16 PM  Procedure end time: 7/26/2018 12:30 PM  Staffing  Anesthesiologist: CORY PHILIPPE JR  Resident/CRNA: ОЛЕГ BORDEN  Performed: resident/CRNA   Anesthesiologist was present at the time of the procedure.  Preanesthetic Checklist  Completed: patient identified, surgical consent, pre-op evaluation, timeout performed, IV checked, risks and benefits discussed, monitors and equipment checked and anesthesia consent given  [facility]ram Line  Skin Prep: chlorhexidine gluconate  Local Infiltration: lidocaine  Location: right,  internal jugular vein  Vessel Caliber: medium, patent, compressibility normal  Vascular Doppler:  not done  Introducer: 9 Fr single lumen, manometry used.  Device: transvenous pacing catheter, fluoroscopy used.   Catheter Size: 9 Fr  Catheter placement by yes. Heme positive aspiration all ports.Sterile sheath usedInsertion Attempts: 1  Indication: intravenous therapy, transvenous pacing  Ultrasound Guidance  Needle advanced into vessel with real time Ultrasound guidance.  Guidewire confirmed in vessel.  Sterile sheath used.  Assessment  Central Line Bundle Protocol followed. Hand hygiene before procedure, surgical cap worn, surgical mask worn, sterile surgical gloves worn, large sterile drape used.  Verification: blood return and ultrasound  Dressing: secured with tape and tegaderm  Patient: Tolerated Well

## 2018-07-26 NOTE — HPI
78 y.o.  severe AS, now s/p TAVR, EP consulted per protocol. Has pre-existing RBBB and LAFB , . TVP VVI 40 not pacer dependent, threshold is 0.8. Telemetry shows NSR with very brief paroxysms of SVT.     ECHO (Date 5/25/2018): JOEY 0.84 cm2, AVAi 0.36 cm2/m2, peak aortic jet velocity 4.6 m/s,MG 58 mmHg, LVEF 65%  LHC (Date 6/8/18 ): Non-dominant mRCA 99% disease. LCx and LAD non obstructive  Other hx of HLD, GERD, HFpEF, HTN

## 2018-07-26 NOTE — H&P (VIEW-ONLY)
Subjective:    Patient ID:  Saumya Wilcox is a 78 y.o. male who presents for evaluation of Aortic stenosis.        Referring Physician: Dr. Rojo ([place])    HPI    Mr. Wilcox is a 78 year old gentleman initially referred by Dr. Rojo for evaluation of Aortic Stenosis here for follow up. He was deemed Cohort C for TAVR in May, 2018 and recommended for rehab. Patient increased his exercise at home including swimming and here for follow up. He reports NYHA FC III symptoms now.      He has a PMHx of chronic hip and current back pain (on tramodol multiple times a day) walks with a walker, ankylosing spondylitis, HTN, HLD, chronic diastolic heart failure, GERD and BPH. He cannot perform his ADLs and has assistance by family. He gets dyspneic ambulating 50ft with his walker to the mailbox. He denies chest pain, light headedness, syncope. He spends his days in a chair and only ambulates with his walker to get the mail. His SOB is somewhat alleviated with Lasix. He has orthopnea for which he sleeps in a recliner.      Saumya Wilcox is a 78 y.o. male referred by Dr Rojo for evaluation of severe AS (NYHA Class III sx).    he has undergone the following TAVR work-up:   ECHO (Date 5/25/2018): JOEY 0.84 cm2, AVAi 0.36 cm2/m2, peak aortic jet velocity 4.6 m/s,MG 58 mmHg, LVEF 65%  LHC (Date 6/8/18 ): Non-dominant mRCA 99% disease. LCx and LAD non obstructive  STS: 4.63%   Frailty: 1/4  Iliacs are >9.54 on L and > 9.47 on R   LVOT area by CTA is 4.86 cm2 and Avg Diameter is 24.9 (distance 29.0 mm x 21.6 mm) per Dr Phelps   CT Incidental Findings: Ankylosing Spondylitis   CTS risk assessment: Inoperable due to impaired mobility per Dr. Aburto   Rhythm Issues: Pending   6 MWT: 500 ft (in 3:39)  PFTs: moderate FEV1 56% predicted, FVC 60% predicted, MVV 38% predicted, DLCO 102% predicted    He is a  candidate for 26mm S3 (7% OS) via RTF access.     Review of Systems   Constitution: Negative for chills, decreased  appetite, fever, weakness, night sweats, weight gain and weight loss.   HENT: Negative for congestion, hoarse voice, stridor and tinnitus.    Eyes: Negative for blurred vision, pain and visual disturbance.   Cardiovascular: Positive for dyspnea on exertion and orthopnea. Negative for chest pain, claudication, irregular heartbeat, leg swelling, near-syncope, palpitations, paroxysmal nocturnal dyspnea and syncope.   Respiratory: Negative for cough, hemoptysis, shortness of breath, snoring and wheezing.    Endocrine: Negative for cold intolerance, heat intolerance and polyuria.   Hematologic/Lymphatic: Negative for bleeding problem. Does not bruise/bleed easily.   Skin: Negative for color change and rash.   Musculoskeletal: Positive for arthritis, back pain and stiffness. Negative for joint pain, muscle cramps and myalgias.   Gastrointestinal: Negative for abdominal pain, anorexia, constipation, diarrhea, dysphagia, heartburn, hemorrhoids, melena, nausea and vomiting.   Genitourinary: Negative for frequency, hematuria, hesitancy, nocturia and urgency.   Neurological: Positive for focal weakness. Negative for difficulty with concentration, dizziness, headaches, light-headedness, numbness, seizures, tremors and vertigo.   Psychiatric/Behavioral: Negative for altered mental status and depression. The patient does not have insomnia.    Allergic/Immunologic: Negative for hives.       Objective:    Physical Exam   Constitutional: He is oriented to person, place, and time. He appears well-developed and well-nourished.   HENT:   Head: Normocephalic and atraumatic.   Eyes: Conjunctivae are normal. Pupils are equal, round, and reactive to light.   Neck: Normal range of motion. No JVD present. No tracheal deviation present. No thyromegaly present.   Cardiovascular: Regular rhythm, S1 normal, S2 normal, normal heart sounds, intact distal pulses and normal pulses.   No extrasystoles are present. Exam reveals no S3.    No murmur  heard.  Pulses:       Carotid pulses are 2+ on the right side, and 2+ on the left side.       Radial pulses are 2+ on the right side, and 2+ on the left side.        Femoral pulses are 2+ on the right side, and 2+ on the left side.       Dorsalis pedis pulses are 2+ on the right side, and 2+ on the left side.        Posterior tibial pulses are 2+ on the right side, and 2+ on the left side.   Late peaking systolic murmur    Pulmonary/Chest: Effort normal and breath sounds normal. No stridor. No respiratory distress. He has no wheezes. He has no rales.   Abdominal: Soft. Bowel sounds are normal. He exhibits no distension. There is no tenderness.   Musculoskeletal: He exhibits deformity. He exhibits no edema or tenderness.   Neurological: He is alert and oriented to person, place, and time.   Skin: Skin is warm and dry. He is not diaphoretic. No erythema.   Psychiatric: He has a normal mood and affect.         Assessment:       1. Severe - calcific aortic valve stenosis - Symptomatic with NYHA FC III    he has undergone the following TAVR work-up:   ECHO (Date 5/25/2018): JOEY 0.84 cm2,  peak aortic jet velocity 4.6 m/s,MG 58 mmHg, LVEF 65%  LHC (Date 6/8/18 ): Non-dominant mRCA 99% disease. LCx and LAD non obstructive  STS: 4.63%   Frailty: 1/4  Iliacs are >9.54 on L and > 9.47 on R   LVOT area by CTA is 4.86 cm2 and Avg Diameter is 24.9 (distance 29.0 mm x 21.6 mm) per Dr Phelps   CT Incidental Findings: Ankylosing Spondylitis   CTS risk assessment: Inoperable due to impaired mobility per Dr. Aburto   Rhythm Issues: Pending   6 MWT: 500 ft (in 3:39)  PFTs: moderate FEV1 56% predicted, FVC 60% predicted, MVV 38% predicted, DLCO 102% predicted    He is a  candidate for 26mm S3 (7% OS) via RTF access.     2. Hypertension, unspecified type - controlled    3. Other hyperlipidemia - on statin therapy   4. Ankylosing spondylitis, unspecified site of spine    5. Gastroesophageal reflux disease without esophagitis    6.      BPH  with overflow incontinence.      Plan:     Transcatheter Aortic valve replacement   1. Valve: 26 mm S3  (7% oversized)  2. TAVR access: RTF  3. Valvuloplasty balloon: 22 mm true  4. Viabahn size if needed: 10 x 5  5. Antithrombotic therapy: ASA and plavix  6. Pacemaker risk factors:   1. The patient was explained the the procedure carries around a 12.5% risk of permanent pacemaker requirement and that risk depends on the patients underlying conduction system.  7. Patient was educated abut the pathophysiology and natural history of severe aortic stenosis and was educated about the treatment options including surgical and transcatheter valve replacement. She agrees to be full code for the forseable future and to undergo workup for treatment of severe AS.   8. The risks, benefits, and alternatives of transcatheter aortic valve replacement were discussed with the patient. All questions were answered and informed consent was obtained. I had a detailed discussion with the patient regarding risk of stroke, MI, bleeding access site complications including limb loss, allergy, kidney failure including dialysis and death.  9. The patient understands the risks and benefits and wishes to go ahead with the procedure.  10. All patient's questions were answered      Andrea Vitale MD  [id number]  Interventional Cardiology Fellow     Staff:   I have personally taken the history and examined this patient and agree with the fellow's note as stated above and amended it accordingly :-) The patient has pre-habbed well and will undergo TAVR tomorrow.  Esa thinks TF will be possible after he is sedated and relaxed.

## 2018-07-26 NOTE — INTERVAL H&P NOTE
The patient has been examined and the H&P has been reviewed:    No change to note from yesterday    Anesthesia/Surgery risks, benefits and alternative options discussed and understood by patient/family.          There are no hospital problems to display for this patient.

## 2018-07-26 NOTE — ANESTHESIA PROCEDURE NOTES
Arterial    Diagnosis: Aortic stenosis    Patient location during procedure: done in OR  Procedure start time: 7/26/2018 12:05 PM  Timeout: 7/26/2018 12:05 PM  Procedure end time: 7/26/2018 12:11 PM  Staffing  Anesthesiologist: CORY PHILIPPE JR  Resident/CRNA: ОЛЕГ BORDEN  Performed: resident/CRNA   Anesthesiologist was present at the time of the procedure.  Preanesthetic Checklist  Completed: patient identified, surgical consent, pre-op evaluation, timeout performed, IV checked, risks and benefits discussed, monitors and equipment checked and anesthesia consent givenArterial  Skin Prep: chlorhexidine gluconate  Local Infiltration: none  Orientation: right  Location: radial  Catheter Size: 20 G  Catheter placement by Anatomical landmarks. Heme positive aspiration all ports.Insertion Attempts: 1  Assessment  Dressing: secured with tape and tegaderm  Patient: Tolerated well

## 2018-07-26 NOTE — PLAN OF CARE
Problem: Patient Care Overview  Goal: Plan of Care Review  Outcome: Ongoing (interventions implemented as appropriate)  Neuro: Pt came sedated from cath lab, pt now alert but fluctuates being oriented to place and time. Afebrile   Resp: Weaned from 7L simple facemask to 2L NC  Cardiac: Pt rate varies from 48 to 60s, moments of unsustained ventricular rhythm in 110s. Noted Bundle branch block. 12 Lead EKGs performed. Pacer wires maintained, spare battery  GI/: Pt voided by urinal 225mL, no BM  Access: peripheral IV, R radial A line, Introducer to Right IJ   Musk/Skin: gauze with tegaderm at left and right groin. PT tolerated HOB up. Walker ordered for patient.   GTT: Dextrose 5% 0,45 NacL with KCl 40 mEq running continuously at 100mL/hr.     Progression of Care: Pt had TAVR procedure. Neuro, neurovascular, and vitals checked per order. L groin site gauze changed once for saturated blood, pressure held for 5 minutes prior, gauze remained C/D/I, no drainage. Pt complained of pain to left eye and difficulty opening it. Redness noted to eye and sterile normal saline flush used to cleanse the eye. Tramadol given for neck pain. Will continue to monitor pt over night to access for need of pacemaker. NPO after midnight. POC reviewed with Saumya Wilcox and family.  All questions answered. Will continue to monitor closely.

## 2018-07-26 NOTE — PROGRESS NOTES
Admitted to SSCU for procedure. VSS. C/o SOB with when deep breathing. Ambulates well with walker. Pre procedural process reviewed.

## 2018-07-26 NOTE — TRANSFER OF CARE
"Anesthesia Transfer of Care Note    Patient: Saumya Wilcox    Procedure(s) Performed: Procedure(s) (LRB):  Replacement-valve-aortic (N/A)    Patient location: ICU    Anesthesia Type: general    Transport from OR: Transported from OR on 6-10 L/min O2 by face mask with adequate spontaneous ventilation. Continuos invasive BP monitoring in transport. Continuous SpO2 monitoring in transport. Continuous ECG monitoring in transport    Post pain: adequate analgesia    Post assessment: no apparent anesthetic complications    Post vital signs: stable    Level of consciousness: sedated and lethargic    Nausea/Vomiting: no nausea/vomiting    Complications: none    Transfer of care protocol was followed      Last vitals:   Visit Vitals  /71 (BP Location: Left arm, Patient Position: Sitting)   Pulse 60   Temp 36.5 °C (97.7 °F) (Axillary)   Resp (!) 22   Ht 6' 2" (1.88 m)   Wt 104.8 kg (231 lb)   SpO2 98%   BMI 29.66 kg/m²     "

## 2018-07-26 NOTE — PROGRESS NOTES
1400- pt arrived from cath lab. K solution started. Pt responds to painful stimuli, still under sedation. Pulses palpated. R groin site gauze without drainage, L groin site had outlined drainage taking up 25% on gauze and by 1430 gauze 100% saturated. Pressure held and new dressing placed. Neurovascular checks and vital signs monitored per order. 2 12 lead ekg obtained and reviewed by Md Pedroza. Pt rate typically 48-60s, had jumped up to 110 two times lasting less than a minute. This moment was captured and saved in Muse.

## 2018-07-26 NOTE — ANESTHESIA PREPROCEDURE EVALUATION
07/26/2018  Saumya Wilcox is a 78 y.o., male.    Anesthesia Evaluation    I have reviewed the Patient Summary Reports.    I have reviewed the Nursing Notes.   I have reviewed the Medications.     Review of Systems  Anesthesia Hx:  No problems with previous Anesthesia  Denies Family Hx of Anesthesia complications.   Denies Personal Hx of Anesthesia complications.   Social:  Non-Smoker, No Alcohol Use    Cardiovascular:   Exercise tolerance: good Hypertension CHF CONCLUSIONS     1 - Concentric remodeling.     2 - Normal left ventricular systolic function (EF 60-65%).     3 - Normal right ventricular systolic function .     4 - Biatrial enlargement.     5 - Severe aortic valve stenosis (JOEY 0.84 cm2, AVAi 0.36 cm2/m2, peak aortic jet velocity 4.6 m/s,MG 58 mmHg, ).  Functional Capacity good / => 4 METS    Renal/:   BPH    Hepatic/GI:   GERD        Physical Exam   Airway/Jaw/Neck:  Airway Findings: Mouth Opening: Normal Tongue: Normal  General Airway Assessment: Adult, Good  Mallampati: II  TM Distance: Normal, at least 6 cm       Chest/Lungs:  Chest/Lungs Findings: Normal Respiratory Rate         Mental Status:  Mental Status Findings:  Cooperative, Alert and Oriented         Anesthesia Plan  Type of Anesthesia, risks & benefits discussed:  Anesthesia Type:  general  Patient's Preference: General  Intra-op Monitoring Plan: standard ASA monitors, arterial line and central line  Intra-op Monitoring Plan Comments:   Post Op Pain Control Plan: per primary service following discharge from PACU  Post Op Pain Control Plan Comments: Per primary service  Induction:   IV  Beta Blocker:  Patient is on a Beta-Blocker and has received one dose within the past 24 hours (No further documentation required).       Informed Consent: Patient understands risks and agrees with Anesthesia plan.  Questions answered. Anesthesia  consent signed with patient.  ASA Score: 4     Day of Surgery Review of History & Physical:    H&P update referred to the surgeon.         Ready For Surgery From Anesthesia Perspective.

## 2018-07-26 NOTE — ASSESSMENT & PLAN NOTE
79 y/o M s/p TAVR, pre-op RBBB with LAFB, nevertheless his native conduction is preserved. Also has some brief paroxysms of SVT likely Atrial Tachycardia.    -Agree with TVP  -NPO after midnight   -EKG in the morning   No heparin products please

## 2018-07-26 NOTE — SUBJECTIVE & OBJECTIVE
Past Medical History:   Diagnosis Date    Arthritis     CHF (congestive heart failure)     Hypertension        Past Surgical History:   Procedure Laterality Date    LEFT HEART CATHETERIZATION Left 6/8/2018    Procedure: Left heart cath;  Surgeon: Beto Phelps MD;  Location: [facility] CATH LAB;  Service: Cardiology;  Laterality: Left;    TONSILLECTOMY         Review of patient's allergies indicates:   Allergen Reactions    Contac [[id number]-acetaminophen]        No current facility-administered medications on file prior to encounter.      Current Outpatient Prescriptions on File Prior to Encounter   Medication Sig    acetaminophen (TYLENOL) 500 MG tablet Take 500 mg by mouth.    aspirin (ECOTRIN) 81 MG EC tablet Take 81 mg by mouth.    atenolol-chlorthalidone (TENORETIC) 50-25 mg Tab     chlorpheniramine/phenylephrine (ALLERFED COLD AND ALLERGY ORAL) Take 1 tablet by mouth once daily.    mupirocin (BACTROBAN) 2 % ointment     omeprazole (PRILOSEC) 20 MG capsule Take 20 mg by mouth.    polyethylene glycol (GLYCOLAX) 17 gram/dose powder     potassium chloride SA (K-DUR,KLOR-CON) 20 MEQ tablet     simvastatin (ZOCOR) 40 MG tablet     traMADol (ULTRAM) 50 mg tablet     furosemide (LASIX) 20 MG tablet     furosemide (LASIX) 40 MG tablet      Family History     None        Social History Main Topics    Smoking status: Never Smoker    Smokeless tobacco: Never Used    Alcohol use No    Drug use: No    Sexual activity: Not on file     Review of Systems   Unable to perform ROS: acuity of condition     Objective:     Vital Signs (Most Recent):  Temp: 97.7 °F (36.5 °C) (07/26/18 1415)  Pulse: (!) 59 (07/26/18 1430)  Resp: (!) 23 (07/26/18 1430)  BP: 105/71 (07/26/18 0805)  SpO2: 98 % (07/26/18 1430) Vital Signs (24h Range):  Temp:  [97.6 °F (36.4 °C)-97.7 °F (36.5 °C)] 97.7 °F (36.5 °C)  Pulse:  [] 59  Resp:  [20-23] 23  SpO2:  [96 %-98 %] 98 %  BP: (105-117)/(69-71) 105/71  Arterial Line BP:  (112-114)/(47-49) 112/47       Weight: 104.8 kg (231 lb)  Body mass index is 29.66 kg/m².    SpO2: 98 %  O2 Device (Oxygen Therapy): Simple Face Mask    Physical Exam   Constitutional: He appears well-developed and well-nourished.   HENT:   Head: Normocephalic and atraumatic.   Cardiovascular: Normal rate and regular rhythm.    Pulmonary/Chest: Effort normal and breath sounds normal. No respiratory distress. He has no wheezes. He has no rales.   Musculoskeletal: He exhibits no edema.   Skin: Skin is warm.       Significant Labs: All pertinent lab results from the last 24 hours have been reviewed.    Significant Imaging: EKG: As in HPi

## 2018-07-27 ENCOUNTER — CLINICAL SUPPORT (OUTPATIENT)
Dept: ELECTROPHYSIOLOGY | Facility: CLINIC | Age: 78
DRG: 266 | End: 2018-07-27
Attending: INTERNAL MEDICINE
Payer: MEDICARE

## 2018-07-27 VITALS
BODY MASS INDEX: 29.65 KG/M2 | HEART RATE: 109 BPM | SYSTOLIC BLOOD PRESSURE: 107 MMHG | OXYGEN SATURATION: 100 % | HEIGHT: 74 IN | WEIGHT: 231 LBS | TEMPERATURE: 99 F | DIASTOLIC BLOOD PRESSURE: 55 MMHG | RESPIRATION RATE: 16 BRPM

## 2018-07-27 DIAGNOSIS — I44.30 ATRIOVENTRICULAR BLOCK: ICD-10-CM

## 2018-07-27 DIAGNOSIS — Z95.2 S/P TAVR (TRANSCATHETER AORTIC VALVE REPLACEMENT): ICD-10-CM

## 2018-07-27 PROBLEM — D62 POSTOPERATIVE ANEMIA DUE TO ACUTE BLOOD LOSS: Status: ACTIVE | Noted: 2018-07-27

## 2018-07-27 PROBLEM — I50.33 ACUTE ON CHRONIC DIASTOLIC HEART FAILURE: Status: ACTIVE | Noted: 2018-07-27

## 2018-07-27 PROBLEM — H57.12 PAIN OF LEFT EYE: Status: ACTIVE | Noted: 2018-07-27

## 2018-07-27 LAB
ANION GAP SERPL CALC-SCNC: 9 MMOL/L
BASOPHILS # BLD AUTO: 0.03 K/UL
BASOPHILS NFR BLD: 0.2 %
BUN SERPL-MCNC: 19 MG/DL
CALCIUM SERPL-MCNC: 8.8 MG/DL
CHLORIDE SERPL-SCNC: 98 MMOL/L
CO2 SERPL-SCNC: 32 MMOL/L
CREAT SERPL-MCNC: 0.8 MG/DL
DIFFERENTIAL METHOD: ABNORMAL
EOSINOPHIL # BLD AUTO: 0.1 K/UL
EOSINOPHIL NFR BLD: 0.3 %
ERYTHROCYTE [DISTWIDTH] IN BLOOD BY AUTOMATED COUNT: 12.7 %
EST. GFR  (AFRICAN AMERICAN): >60 ML/MIN/1.73 M^2
EST. GFR  (NON AFRICAN AMERICAN): >60 ML/MIN/1.73 M^2
GLUCOSE SERPL-MCNC: 137 MG/DL
HCT VFR BLD AUTO: 37.8 %
HGB BLD-MCNC: 13.1 G/DL
IMM GRANULOCYTES # BLD AUTO: 0.08 K/UL
IMM GRANULOCYTES NFR BLD AUTO: 0.6 %
LYMPHOCYTES # BLD AUTO: 1.4 K/UL
LYMPHOCYTES NFR BLD: 10 %
MCH RBC QN AUTO: 34.2 PG
MCHC RBC AUTO-ENTMCNC: 34.7 G/DL
MCV RBC AUTO: 99 FL
MONOCYTES # BLD AUTO: 0.8 K/UL
MONOCYTES NFR BLD: 5.9 %
NEUTROPHILS # BLD AUTO: 11.9 K/UL
NEUTROPHILS NFR BLD: 83 %
NRBC BLD-RTO: 0 /100 WBC
PLATELET # BLD AUTO: 191 K/UL
PMV BLD AUTO: 9.5 FL
POC ACTIVATED CLOTTING TIME K: 142 SEC (ref 74–137)
POC ACTIVATED CLOTTING TIME K: 274 SEC (ref 74–137)
POTASSIUM SERPL-SCNC: 3.5 MMOL/L
POTASSIUM SERPL-SCNC: 3.6 MMOL/L
RBC # BLD AUTO: 3.83 M/UL
SAMPLE: ABNORMAL
SAMPLE: ABNORMAL
SODIUM SERPL-SCNC: 139 MMOL/L
WBC # BLD AUTO: 14.35 K/UL

## 2018-07-27 PROCEDURE — 27200188 HC TRANSDUCER, ART ADULT/PEDS

## 2018-07-27 PROCEDURE — 63600175 PHARM REV CODE 636 W HCPCS: Performed by: INTERNAL MEDICINE

## 2018-07-27 PROCEDURE — 93272 ECG/REVIEW INTERPRET ONLY: CPT | Mod: ,,, | Performed by: INTERNAL MEDICINE

## 2018-07-27 PROCEDURE — 93010 ELECTROCARDIOGRAM REPORT: CPT | Mod: 76,,, | Performed by: INTERNAL MEDICINE

## 2018-07-27 PROCEDURE — 25000003 PHARM REV CODE 250: Performed by: PHYSICIAN ASSISTANT

## 2018-07-27 PROCEDURE — 93010 ELECTROCARDIOGRAM REPORT: CPT | Mod: ,,, | Performed by: INTERNAL MEDICINE

## 2018-07-27 PROCEDURE — 99232 SBSQ HOSP IP/OBS MODERATE 35: CPT | Mod: GC,,, | Performed by: INTERNAL MEDICINE

## 2018-07-27 PROCEDURE — 25000003 PHARM REV CODE 250: Performed by: STUDENT IN AN ORGANIZED HEALTH CARE EDUCATION/TRAINING PROGRAM

## 2018-07-27 PROCEDURE — 25000003 PHARM REV CODE 250: Performed by: INTERNAL MEDICINE

## 2018-07-27 PROCEDURE — 80048 BASIC METABOLIC PNL TOTAL CA: CPT

## 2018-07-27 PROCEDURE — 36620 INSERTION CATHETER ARTERY: CPT

## 2018-07-27 PROCEDURE — 97161 PT EVAL LOW COMPLEX 20 MIN: CPT

## 2018-07-27 PROCEDURE — 94761 N-INVAS EAR/PLS OXIMETRY MLT: CPT

## 2018-07-27 PROCEDURE — 93005 ELECTROCARDIOGRAM TRACING: CPT

## 2018-07-27 PROCEDURE — 51798 US URINE CAPACITY MEASURE: CPT

## 2018-07-27 PROCEDURE — 85025 COMPLETE CBC W/AUTO DIFF WBC: CPT

## 2018-07-27 PROCEDURE — 33210 INSERT ELECTRD/PM CATH SNGL: CPT

## 2018-07-27 PROCEDURE — 99233 SBSQ HOSP IP/OBS HIGH 50: CPT | Mod: ,,, | Performed by: INTERNAL MEDICINE

## 2018-07-27 PROCEDURE — 84132 ASSAY OF SERUM POTASSIUM: CPT

## 2018-07-27 RX ORDER — CLOPIDOGREL BISULFATE 75 MG/1
75 TABLET ORAL DAILY
Qty: 30 TABLET | Refills: 11 | Status: SHIPPED | OUTPATIENT
Start: 2018-07-28 | End: 2019-07-28

## 2018-07-27 RX ORDER — ATENOLOL 25 MG/1
50 TABLET ORAL DAILY
Status: DISCONTINUED | OUTPATIENT
Start: 2018-07-27 | End: 2018-07-27 | Stop reason: HOSPADM

## 2018-07-27 RX ORDER — CLOPIDOGREL BISULFATE 75 MG/1
75 TABLET ORAL DAILY
Status: DISCONTINUED | OUTPATIENT
Start: 2018-07-27 | End: 2018-07-27 | Stop reason: HOSPADM

## 2018-07-27 RX ADMIN — ERYTHROMYCIN 1 INCH: 5 OINTMENT OPHTHALMIC at 08:07

## 2018-07-27 RX ADMIN — POTASSIUM CHLORIDE 20 MEQ: 1500 TABLET, EXTENDED RELEASE ORAL at 03:07

## 2018-07-27 RX ADMIN — POTASSIUM CHLORIDE 20 MEQ: 1500 TABLET, EXTENDED RELEASE ORAL at 05:07

## 2018-07-27 RX ADMIN — POTASSIUM CHLORIDE 20 MEQ: 1500 TABLET, EXTENDED RELEASE ORAL at 01:07

## 2018-07-27 RX ADMIN — DEXTROSE MONOHYDRATE, SODIUM CHLORIDE, AND POTASSIUM CHLORIDE: 50; 4.5; 2.98 INJECTION, SOLUTION INTRAVENOUS at 12:07

## 2018-07-27 RX ADMIN — ERYTHROMYCIN 1 INCH: 5 OINTMENT OPHTHALMIC at 01:07

## 2018-07-27 RX ADMIN — ASPIRIN 81 MG: 81 TABLET, COATED ORAL at 08:07

## 2018-07-27 RX ADMIN — ATENOLOL 50 MG: 25 TABLET ORAL at 08:07

## 2018-07-27 RX ADMIN — CLOPIDOGREL 75 MG: 75 TABLET, FILM COATED ORAL at 10:07

## 2018-07-27 RX ADMIN — TRAMADOL HYDROCHLORIDE 50 MG: 50 TABLET, FILM COATED ORAL at 07:07

## 2018-07-27 RX ADMIN — POTASSIUM CHLORIDE 20 MEQ: 1500 TABLET, EXTENDED RELEASE ORAL at 10:07

## 2018-07-27 RX ADMIN — PANTOPRAZOLE SODIUM 40 MG: 40 TABLET, DELAYED RELEASE ORAL at 08:07

## 2018-07-27 RX ADMIN — CEFAZOLIN 1 G: 1 INJECTION, POWDER, FOR SOLUTION INTRAMUSCULAR; INTRAVENOUS at 01:07

## 2018-07-27 NOTE — PLAN OF CARE
Problem: Physical Therapy Goal  Goal: Physical Therapy Goal  Outcome: Outcome(s) achieved Date Met: 07/27/18  Eval completed. Pt safe with functional mobility.

## 2018-07-27 NOTE — PROGRESS NOTES
Dr. Jeronimo called and notified of pt's critical potassium and pt's refusal to take liquid replacements ordered. MD gave verbal order to modify to pill prn algorithm for electrolyte replacement,and to hold off on additional IV riders at this time. Pt to receive total of 120mEq potassium with 60 to be given now and to follow lowest potassium level PRN order. Pt in no acute distress, SB-SR on monitor with no ectopy.

## 2018-07-27 NOTE — SUBJECTIVE & OBJECTIVE
Interval History: No events overnight. No complaints this AM. Awaiting EP recs regarding TVP. Ophthalmology to see pt for eye pain.     Objective:     Vital Signs (Most Recent):  Temp: 98.5 °F (36.9 °C) (07/27/18 0701)  Pulse: (!) 123 (07/27/18 0839)  Resp: (!) 32 (07/27/18 0800)  BP: (!) 120/58 (07/27/18 0839)  SpO2: 96 % (07/27/18 0800) Vital Signs (24h Range):  Temp:  [97 °F (36.1 °C)-99.7 °F (37.6 °C)] 98.5 °F (36.9 °C)  Pulse:  [] 123  Resp:  [16-32] 32  SpO2:  [94 %-100 %] 96 %  BP: (120-135)/(58-94) 120/58  Arterial Line BP: (112-166)/(41-69) 146/68     Weight: 104.8 kg (231 lb)  Body mass index is 29.66 kg/m².    SpO2: 96 %  O2 Device (Oxygen Therapy): room air      Intake/Output Summary (Last 24 hours) at 07/27/18 0852  Last data filed at 07/27/18 0600   Gross per 24 hour   Intake          3033.33 ml   Output             1325 ml   Net          1708.33 ml       Lines/Drains/Airways     Central Venous Catheter Line                 Introducer 07/26/18 right internal jugular 1 day         Pulmonary Artery Catheter Assessment  07/26/18 1220 less than 1 day          Arterial Line                 Arterial Line 07/26/18 1205 Right Radial less than 1 day          Line                 Pacer Wires 07/26/18 1 day          Peripheral Intravenous Line                 Peripheral IV - Single Lumen 07/26/18 0800 Left Forearm 1 day                Physical Exam   Constitutional: He is oriented to person, place, and time. He appears well-developed and well-nourished.   HENT:   Head: Normocephalic and atraumatic.   Eyes: EOM are normal. Pupils are equal, round, and reactive to light.   Neck: Neck supple. JVD present. No tracheal deviation present. No thyromegaly present.   Cardiovascular: Regular rhythm, S1 normal, S2 normal, intact distal pulses and normal pulses.  Tachycardia present.  PMI is not displaced.  Exam reveals no gallop and no friction rub.    Murmur heard.  Pulmonary/Chest: Effort normal. No respiratory  distress. He has decreased breath sounds in the right lower field and the left lower field. He has no wheezes. He has no rales. He exhibits no tenderness.   Abdominal: Soft. Bowel sounds are normal. He exhibits no distension and no mass. There is no tenderness.   Musculoskeletal: Normal range of motion. He exhibits no edema or tenderness.   Neurological: He is alert and oriented to person, place, and time.   Skin: Skin is warm and dry. No rash noted.   Psychiatric: He has a normal mood and affect. His behavior is normal.       Significant Labs:   BMP:   Recent Labs  Lab 07/26/18  0930 07/26/18  2100 07/27/18  0533   * 156* 137*    139 139   K 2.6* 2.7* 3.5   CL 92* 97 98   CO2 35* 34* 32*   BUN 23 23 19   CREATININE 1.1 1.0 0.8   CALCIUM 11.0* 9.0 8.8   MG  --  2.1  --     and CBC   Recent Labs  Lab 07/25/18  1153 07/26/18  0930 07/27/18  0533   WBC 11.95 12.89* 14.35*   HGB 14.4 15.4 13.1*   HCT 42.6 45.9 37.8*    292 191

## 2018-07-27 NOTE — ASSESSMENT & PLAN NOTE
77 yo M s/p TAVR with acute left eye pain without any vision changes    -VA, Pupils, IOP wnl  -Small epi defect at 4 o'clock , Small conj defect at 9 o'clock  -erythromycin ointment TID OS - pt reports pain was improved with this  -ATs prn  -RTC as needed

## 2018-07-27 NOTE — ASSESSMENT & PLAN NOTE
Fluid balance + with evidence of volume overload on physical exam.   Will diurese with IV lasix and continue PO lasix at discharge.

## 2018-07-27 NOTE — PT/OT/SLP EVAL
Physical Therapy Evaluation/Discharge    Patient Name:  Saumya Wilcox   MRN:  [id number]    S/p TAVR 7/26    Recommendations:     Discharge Recommendations:  home   Discharge Equipment Recommendations: none   Barriers to discharge: None    Assessment:     Saumya Wilcox is a 78 y.o. male admitted with a medical diagnosis of Aortic stenosis.  He presents with the following impairments/functional limitations:  weakness. PT evaluation complete and no goals established. Pt is functioning at high level and does not required acute care physical therapy services. Education provided to ambulate in hallway 3x/day with RN in order to maintain strength and endurance. Pt verbalized understanding. Please re-consult if functional mobility changes. Anticipate d/c to home; no needs.     Rehab Prognosis:  good;    Recent Surgery: Procedure(s) (LRB):  Replacement-valve-aortic (N/A) 1 Day Post-Op    Plan:     D/c from acute PT 2nd to pt safe with functional mobility         Plan of Care Reviewed with: patient    Subjective     Communicated with RN prior to session and family present in room.  Patient found in chair upon PT entry to room, agreeable to evaluation.      Chief Complaint: none reported  Patient comments/goals: to return home   Pain/Comfort:  · Pain Rating 1: 0/10  · Pain Rating Post-Intervention 1: 0/10    Patients cultural, spiritual, Gnosticist conflicts given the current situation: none reported     Living Environment:  Pt lives with son and daughter in law in mobile home with 4 GISSEL to enter and L HR  Prior to admission, patients level of function was mod indep with ambulation and indep with ADL's.  Patient has the following equipment: none.  DME owned (not currently used): none.  Upon discharge, patient will have assistance from family.    Objective:     Patient found with: telemetry, pulse ox (continuous), blood pressure cuff     General Precautions: Standard,     Orthopedic Precautions:N/A   Braces: N/A      Exams:  · Cognitive Exam:    · AAOx4  · Follows multistep commands  · Communication clear/fluent  · [place]  · RLE ROM: WFL  · RLE Strength: WFL  · LLE ROM: WFL  · LLE Strength: WFL    Functional Mobility:  · Bed Mobility: NT 2nd to pt found in chair   · Transfers:     · Sit to Stand:  modified independence with rollator  · Gait: ~250ft with mod indep with rollator  · Severe forward flexed posture; pt unable to correct  · Per family, pt's posture is baseline   · No LOB with horizontal head turns, change of speed, and change in direction   · Slight SOB; SpO2 WFL    AM-PAC 6 CLICK MOBILITY  Total Score:24       Therapeutic Activities and Exercises:  Educated pt on PT role/POC  Educated pt on importance of OOB activity and daily ambulation   Pt verbalized understanding    Patient left up in chair with all lines intact, call button in reach, RN notified and family present.    GOALS:    Physical Therapy Goals     Not on file          Multidisciplinary Problems (Resolved)        Problem: Physical Therapy Goal    Goal Priority Disciplines Outcome Goal Variances Interventions   Physical Therapy Goal   (Resolved)     PT/OT, PT Outcome(s) achieved                     History:     Past Medical History:   Diagnosis Date    Arthritis     CHF (congestive heart failure)     Hypertension        Past Surgical History:   Procedure Laterality Date    AORTIC VALVE REPLACEMENT N/A 7/26/2018    Procedure: Replacement-valve-aortic;  Surgeon: Beto Phelps MD;  Location: [facility] CATH LAB;  Service: Cardiology;  Laterality: N/A;    LEFT HEART CATHETERIZATION Left 6/8/2018    Procedure: Left heart cath;  Surgeon: Beto Phelps MD;  Location: [facility] CATH LAB;  Service: Cardiology;  Laterality: Left;    TONSILLECTOMY         Time Tracking:     PT Received On: 07/27/18  PT Start Time: 1400     PT Stop Time: 1415  PT Total Time (min): 15 min     Billable Minutes: Evaluation 15      Julissa Pardo, PT, DPT  7/27/2018  [phone]

## 2018-07-27 NOTE — DISCHARGE INSTRUCTIONS
Weigh yourself on the same scale every morning. If you gain more than 3 lbs in 1 day or more than 5 lbs in 1 week, or if you experience worsening shortness of breath, swelling in your feet or legs, or difficulty laying flat, take an extra Lasix pill until you reach your normal weight or your symptoms have resolved. If you have any questions, you can call our office at [phone].     You will need to take a single dose of antibiotics prior to certain dental procedures, colonoscopies, or bladder procedures. We can call this prescription in for you or the physician performing the procedure can call it in for you. If you have questions about whether or not a procedure will require antibiotics, you can call our office. Always let your physician know that you have an artificial heart valve.

## 2018-07-27 NOTE — CONSULTS
[facility]  Ophthalmology  Consult Note    Patient Name: Saumya Wilcox  MRN: [id number]  Admission Date: 7/26/2018  Hospital Length of Stay: 1 days  Attending Provider: No att. providers found   Primary Care Physician: Khari Wayne MD  Principal Problem:Aortic stenosis    Consults  Subjective:     Chief Complaint: left eye pain     HPI:   79 yo M with ankylosing spondylitis admitted for severe aortic stenosis and TAVR procedure yesterday (7/26/18). Ophthalmology is consulted for left eye pain.     Last night, pt started complaining of scratchy pain in left eye, with redness, tearing, and photophobia. He denies any blurriness. He received some erythromycin ointment overnight and thinks the pain is improved this morning. He denies previous episodes, flashes of light, new floaters, headaches.    POH: pt is unsure, but says he was treated for something a few years ago. He denies previous eye surgery.    Gtt: OTC artificial tears    PMH: ankylosing spondylitis, HTN, CHF    No new subjective & objective note has been filed under this hospital service since the last note was generated.      Base Eye Exam     Visual Acuity (Snellen - Linear)       Right Left    Dist cc 20/25 -2 20/30          Tonometry (Tonopen, 9:10 AM)       Right Left    Pressure 17 18          Pupils       Dark Light Shape React APD    Right 4 3 Round Brisk None    Left 4 3 Round Brisk None          Visual Fields       Right Left     Full Full          Extraocular Movement       Right Left     Full Full          Neuro/Psych     Mood/Affect:  Normal          Dilation     Both eyes:  2.5% Phenylephrine, 1% Mydriacyl @ 9:16 AM            Slit Lamp and Fundus Exam     Slit Lamp Exam       Right Left    Lids/Lashes Normal Normal    Conjunctiva/Sclera White and quiet Trace Injection, small conj defect at 9 o'clock    Cornea Clear small epithelial defect at 4 o'clock          Fundus Exam       Right Left    Disc Peripapillary atrophy  Peripapillary atrophy    C/D Ratio 0.3 0.3    Macula Normal Normal    Vessels Normal Normal              Assessment and Plan:     Pain of left eye    79 yo M s/p TAVR with acute left eye pain without any vision changes    -VA, Pupils, IOP wnl  -Small epi defect at 4 o'clock , Small conj defect at 9 o'clock  -erythromycin ointment TID OS - pt reports pain was improved with this  -ATs prn  -RTC as needed          Discussed with Dr. Pan    Thank you for your consult. I will sign off. Please contact us if you have any additional questions.    Reji Andres MD  Ophthalmology  [facility]    I have reviewed the history and exam of the patient and agree with the resident's exam, assessment and plan.

## 2018-07-27 NOTE — HPI
77 yo M with ankylosing spondylitis admitted for severe aortic stenosis and TAVR procedure yesterday (7/26/18). Ophthalmology is consulted for left eye pain.     Last night, pt started complaining of scratchy pain in left eye, with redness, tearing, and photophobia. He denies any blurriness. He received some erythromycin ointment overnight and thinks the pain is improved this morning. He denies previous episodes, flashes of light, new floaters, headaches.    POH: pt is unsure, but says he was treated for something a few years ago. He denies previous eye surgery.    Gtt: OTC artificial tears    PMH: ankylosing spondylitis, HTN, CHF

## 2018-07-27 NOTE — HOSPITAL COURSE
Mr. Wilcox was admitted and underwent successful placement of a 26 mm Woody S3 TAVR via TF access. There were no complications and he was taken to the CCU in stable condition. TVP was left in place and EP was consulted per protocol. He remained stable overnight. The following morning, he required diuresis for acute on chronic diastolic heart failure. Given his baseline bifascicular block, EP wanted 30-day event monitor at discharge. His TVP was removed. That afternoon, he ambulated in the batista without difficulty. He was eager to go home. It was felt he was stable for discharge.

## 2018-07-27 NOTE — PROGRESS NOTES
Dr. Jeronimo called and notified of pt's drop in H/H from pre procedure level to 13/37. Pt has been receiving IV fluids continuously overnight. MD gave order to stop continuous infusion.

## 2018-07-27 NOTE — DISCHARGE SUMMARY
[facility]  Interventional Cardiology  Discharge Summary      Patient Name: Saumya Wilcox  MRN: [id number]  Admission Date: 7/26/2018  Hospital Length of Stay: 1 days  Discharge Date and Time:  07/27/2018 1:28 PM  Attending Physician: Beto Phelps MD  Discharging Provider: Alison Gonzalez PA-C  Primary Care Physician: Khari Wayne MD    HPI:  Mr. Wilcox is a 78 year old gentleman initially referred by Dr. Rojo for evaluation of Aortic Stenosis here for follow up. He was deemed Cohort C for TAVR in May, 2018 and recommended for rehab. Patient increased his exercise at home including swimming and here for follow up. He reports NYHA FC III symptoms now.        He has a PMHx of chronic hip and current back pain (on tramodol multiple times a day) walks with a walker, ankylosing spondylitis, HTN, HLD, chronic diastolic heart failure, GERD and BPH. He cannot perform his ADLs and has assistance by family. He gets dyspneic ambulating 50ft with his walker to the mailbox. He denies chest pain, light headedness, syncope. He spends his days in a chair and only ambulates with his walker to get the mail. His SOB is somewhat alleviated with Lasix. He has orthopnea for which he sleeps in a recliner.       Saumya Wilcox is a 78 y.o. male referred by Dr Rojo for evaluation of severe AS (NYHA Class III sx).     he has undergone the following TAVR work-up:   · ECHO (Date 5/25/2018): JOEY 0.84 cm2, AVAi 0.36 cm2/m2, peak aortic jet velocity 4.6 m/s,MG 58 mmHg, LVEF 65%  · LHC (Date 6/8/18 ): Non-dominant mRCA 99% disease. LCx and LAD non obstructive  · STS: 4.63%   · Frailty: 1/4  · Iliacs are >9.54 on L and > 9.47 on R   · LVOT area by CTA is 4.86 cm2 and Avg Diameter is 24.9 (distance 29.0 mm x 21.6 mm) per Dr Phelps   · CT Incidental Findings: Ankylosing Spondylitis   · CTS risk assessment: Inoperable due to impaired mobility per Dr. Aburto   · Rhythm Issues: Pending   · 6 MWT: 500 ft (in  3:39)  · PFTs: moderate FEV1 56% predicted, FVC 60% predicted, MVV 38% predicted, DLCO 102% predicted     He is a  candidate for 26mm S3 (7% OS) via RTF access.      Procedure(s) (LRB):  Replacement-valve-aortic (N/A)     Indwelling Lines/Drains at time of discharge:  Lines/Drains/Airways     Central Venous Catheter Line                 Introducer 07/26/18 right internal jugular 1 day          Arterial Line                 Arterial Line 07/26/18 1205 Right Radial 1 day                Hospital Course:  Mr. Wilcox was admitted and underwent successful placement of a 26 mm Woody S3 TAVR via TF access. There were no complications and he was taken to the CCU in stable condition. TVP was left in place and EP was consulted per protocol. He remained stable overnight. The following morning, he required diuresis for acute on chronic diastolic heart failure. Given his baseline bifascicular block, EP wanted 30-day event monitor at discharge. His TVP was removed. That afternoon, he ambulated in the batista without difficulty. He was eager to go home. It was felt he was stable for discharge.       Consults         Status Ordering Provider     Inpatient consult to Electrophysiology  Once     Provider:  (Not yet assigned)    Completed JOSEY CARDOZO     Inpatient consult to Ophthalmology  Once     Provider:  (Not yet assigned)    Acknowledged JONATHON PIERSON            Pending Diagnostic Studies:     None        * Aortic stenosis    S/p 26mm Woody S3 TAVR via R TF access.   Doing well.   On ASA and Plavix.         Pain of left eye    Ophthalmology consult today.         Postoperative anemia due to acute blood loss    Expected post-TAVR.   Asymptomatic.  No indication for transfusion at this time.   Will monitor.         Acute on chronic diastolic heart failure    Fluid balance + with evidence of volume overload on physical exam.   Will diurese with IV lasix and continue PO lasix at discharge.           Other hyperlipidemia     Stable.   On statin.         HTN (hypertension)    Controlled on current regimen.             Discharged Condition: stable    Follow Up:  Follow-up Information     Alison Gonzalez PA-C In 1 month.    Specialty:  Cardiology  Why:  1 month follow up with echo and labs (appointment will be mailed to you  Contact information:  [address]  [facility] [address]  [phone]                 Patient Instructions:     Diet Cardiac     Call MD for:  temperature >100.4     Call MD for:  persistent nausea and vomiting or diarrhea     Call MD for:  severe uncontrolled pain     Call MD for:  redness, tenderness, or signs of infection (pain, swelling, redness, odor or green/yellow discharge around incision site)     Call MD for:  difficulty breathing or increased cough     Call MD for:  severe persistent headache     Call MD for:  worsening rash     Call MD for:  persistent dizziness, light-headedness, or visual disturbances     Call MD for:  increased confusion or weakness     Lifting restrictions   Order Comments: No lifting more than 5 lbs for 5 days.     No driving until:   Order Specific Question Answer Comments   Date: 7/31/2018      No dressing needed     Cardiac event monitor   Standing Status: Future  Standing Exp. Date: 07/28/19   Order Specific Question Answer Comments   Cardiac Event Monitor Auto Trigger      Activity as tolerated       Medications:  Reconciled Home Medications:      Medication List      START taking these medications    clopidogrel 75 mg tablet  Commonly known as:  PLAVIX  Take 1 tablet (75 mg total) by mouth once daily.  Start taking on:  7/28/2018        CONTINUE taking these medications    acetaminophen 500 MG tablet  Commonly known as:  TYLENOL  Take 500 mg by mouth.     ALLERFED COLD AND ALLERGY ORAL  Take 1 tablet by mouth once daily.     aspirin 81 MG EC tablet  Commonly known as:  ECOTRIN  Take 81 mg by mouth.     atenolol-chlorthalidone 50-25 mg Tab  Commonly known as:   TENORETIC     * furosemide 20 MG tablet  Commonly known as:  LASIX     * furosemide 40 MG tablet  Commonly known as:  LASIX     mupirocin 2 % ointment  Commonly known as:  BACTROBAN     omeprazole 20 MG capsule  Commonly known as:  PRILOSEC  Take 20 mg by mouth.     polyethylene glycol 17 gram/dose powder  Commonly known as:  GLYCOLAX     potassium chloride SA 20 MEQ tablet  Commonly known as:  K-DUR,KLOR-CON     simvastatin 40 MG tablet  Commonly known as:  ZOCOR     traMADol 50 mg tablet  Commonly known as:  ULTRAM        * This list has 2 medication(s) that are the same as other medications prescribed for you. Read the directions carefully, and ask your doctor or other care provider to review them with you.                Time spent on the discharge of patient: 40 minutes    Alison Gonzalez PA-C  Interventional Cardiology  [facility]

## 2018-07-27 NOTE — PROGRESS NOTES
[facility]  Interventional Cardiology  Progress Note    Patient Name: Saumya Wilcox  MRN: [id number]  Admission Date: 7/26/2018  Hospital Length of Stay: 1 days  Code Status: Full Code   Attending Physician: Beto Phelps MD   Primary Care Physician: Khari Wayne MD  Principal Problem:Aortic stenosis    Subjective:     Interval History: No events overnight. No complaints this AM. Awaiting EP recs regarding TVP. Ophthalmology to see pt for eye pain.     Objective:     Vital Signs (Most Recent):  Temp: 98.5 °F (36.9 °C) (07/27/18 0701)  Pulse: (!) 123 (07/27/18 0839)  Resp: (!) 32 (07/27/18 0800)  BP: (!) 120/58 (07/27/18 0839)  SpO2: 96 % (07/27/18 0800) Vital Signs (24h Range):  Temp:  [97 °F (36.1 °C)-99.7 °F (37.6 °C)] 98.5 °F (36.9 °C)  Pulse:  [] 123  Resp:  [16-32] 32  SpO2:  [94 %-100 %] 96 %  BP: (120-135)/(58-94) 120/58  Arterial Line BP: (112-166)/(41-69) 146/68     Weight: 104.8 kg (231 lb)  Body mass index is 29.66 kg/m².    SpO2: 96 %  O2 Device (Oxygen Therapy): room air      Intake/Output Summary (Last 24 hours) at 07/27/18 0852  Last data filed at 07/27/18 0600   Gross per 24 hour   Intake          3033.33 ml   Output             1325 ml   Net          1708.33 ml       Lines/Drains/Airways     Central Venous Catheter Line                 Introducer 07/26/18 right internal jugular 1 day         Pulmonary Artery Catheter Assessment  07/26/18 1220 less than 1 day          Arterial Line                 Arterial Line 07/26/18 1205 Right Radial less than 1 day          Line                 Pacer Wires 07/26/18 1 day          Peripheral Intravenous Line                 Peripheral IV - Single Lumen 07/26/18 0800 Left Forearm 1 day                Physical Exam   Constitutional: He is oriented to person, place, and time. He appears well-developed and well-nourished.   HENT:   Head: Normocephalic and atraumatic.   Eyes: EOM are normal. Pupils are equal, round, and reactive to  light.   Neck: Neck supple. JVD present. No tracheal deviation present. No thyromegaly present.   Cardiovascular: Regular rhythm, S1 normal, S2 normal, intact distal pulses and normal pulses.  Tachycardia present.  PMI is not displaced.  Exam reveals no gallop and no friction rub.    Murmur heard.  Pulmonary/Chest: Effort normal. No respiratory distress. He has decreased breath sounds in the right lower field and the left lower field. He has no wheezes. He has no rales. He exhibits no tenderness.   Abdominal: Soft. Bowel sounds are normal. He exhibits no distension and no mass. There is no tenderness.   Musculoskeletal: Normal range of motion. He exhibits no edema or tenderness.   Neurological: He is alert and oriented to person, place, and time.   Skin: Skin is warm and dry. No rash noted.   Psychiatric: He has a normal mood and affect. His behavior is normal.       Significant Labs:   BMP:   Recent Labs  Lab 07/26/18  0930 07/26/18  2100 07/27/18  0533   * 156* 137*    139 139   K 2.6* 2.7* 3.5   CL 92* 97 98   CO2 35* 34* 32*   BUN 23 23 19   CREATININE 1.1 1.0 0.8   CALCIUM 11.0* 9.0 8.8   MG  --  2.1  --     and CBC   Recent Labs  Lab 07/25/18  1153 07/26/18  0930 07/27/18  0533   WBC 11.95 12.89* 14.35*   HGB 14.4 15.4 13.1*   HCT 42.6 45.9 37.8*    292 191         Assessment and Plan:     Patient is a 78 y.o. male presenting with:    * Aortic stenosis    S/p 26mm Woody S3 TAVR via R TF access.   Doing well.   On ASA and Plavix.         Pain of left eye    Ophthalmology consult today.         Postoperative anemia due to acute blood loss    Expected post-TAVR.   Asymptomatic.  No indication for transfusion at this time.   Will monitor.         Acute on chronic diastolic heart failure    Fluid balance + with evidence of volume overload on physical exam.   Will diurese with IV lasix and continue PO lasix at discharge.           Ankylosing spondylitis    On tramadol for pain  Does not follow  with anyone for this  Found on CTA         Other hyperlipidemia    Stable.   On statin.         HTN (hypertension)    Controlled on current regimen.             OOB to chair.   Awaiting EP recommendations regarding TVP.   Lasix 20mg IV for diuresis.   IS at least 10x/hr.   Ophthalmology consult.       VTE Risk Mitigation         Ordered     IP VTE LOW RISK PATIENT  Once      07/27/18 0844          Alison Gonzalez PA-C  Interventional Cardiology  [facility]  [phone]

## 2018-07-27 NOTE — ANESTHESIA POSTPROCEDURE EVALUATION
"Anesthesia Post Evaluation    Patient: Saumya Wilcox    Procedure(s) Performed: Procedure(s) (LRB):  Replacement-valve-aortic (N/A)    Final Anesthesia Type: general  Patient location during evaluation: ICU  Patient participation: Yes- Able to Participate  Level of consciousness: awake and alert  Post-procedure vital signs: reviewed and stable  Pain management: adequate  Airway patency: patent  PONV status at discharge: No PONV  Anesthetic complications: yes (Mr. Wilcox suffered a corneal abrasion to left eye during procedure. While sedated he was unable to )  Perioperative Events: corneal abrasion    Cardiovascular status: blood pressure returned to baseline and hemodynamically stable  Respiratory status: unassisted, spontaneous ventilation and room air  Hydration status: euvolemic  Follow-up not needed.  Comments: Mr. Wilcox suffered a corneal abrasion to left eye during procedure. While sedated he was unable to fully close eyes when sedated.  Aqueaous lube placed in eyes with sharn eyeguards ; however, Mr. Wilcox could not  tolerate eye guards with level of sedation. We removed eyeguards and placed drops multiple times to prevent abrasion.    Discussed these facts with family immediately after procedure.        Visit Vitals  BP (!) 120/58   Pulse (!) 123   Temp 36.9 °C (98.5 °F)   Resp (!) 32   Ht 6' 2" (1.88 m)   Wt 104.8 kg (231 lb)   SpO2 96%   BMI 29.66 kg/m²       Pain/Panda Score: Pain Assessment Performed: Yes (7/27/2018  5:00 AM)  Presence of Pain: denies (7/27/2018  5:00 AM)  Pain Rating Prior to Med Admin: 6 (7/27/2018  7:19 AM)  Pain Rating Post Med Admin: 3 (7/27/2018  8:19 AM)      "

## 2018-07-27 NOTE — PROGRESS NOTES
"[facility]  Cardiac Electrophysiology  Progress Note    Admission Date: 7/26/2018  Code Status: Full Code   Attending Physician: Beto Phelps MD   Expected Discharge Date: 7/27/2018  Principal Problem:Aortic stenosis    Subjective:     Interval History: Patient reports feeling "fair" this morning. He denies dizziness, light headedness or syncope. He reports right lower extremity pain in the lateral femoral region, not at the access site. He reports no further symptoms or concerns at this time.    Review of Systems   Constitution: Negative for chills, diaphoresis, fever and night sweats.   HENT: Negative.    Eyes: Negative for blurred vision and photophobia.   Cardiovascular: Negative for chest pain, cyanosis, irregular heartbeat, near-syncope, palpitations and syncope.   Respiratory: Negative for cough, hemoptysis, shortness of breath and wheezing.    Skin: Negative for color change and rash.   Musculoskeletal: Negative for back pain and falls.   Gastrointestinal: Negative for abdominal pain, constipation, diarrhea, nausea and vomiting.   Genitourinary: Negative for dysuria and hematuria.   Neurological: Negative for focal weakness, numbness and seizures.   Psychiatric/Behavioral: Negative for altered mental status. The patient is not nervous/anxious.      Objective:     Vital Signs (Most Recent):  Temp: 98.5 °F (36.9 °C) (07/27/18 0701)  Pulse: 109 (07/27/18 1300)  Resp: 16 (07/27/18 1300)  BP: (!) 107/55 (07/27/18 1200)  SpO2: 100 % (07/27/18 1300) Vital Signs (24h Range):  Temp:  [97 °F (36.1 °C)-99.7 °F (37.6 °C)] 98.5 °F (36.9 °C)  Pulse:  [] 109  Resp:  [16-32] 16  SpO2:  [94 %-100 %] 100 %  BP: (107-135)/(55-94) 107/55  Arterial Line BP: (112-166)/(41-75) 143/59     Weight: 104.8 kg (231 lb)  Body mass index is 29.66 kg/m².     SpO2: 100 %  O2 Device (Oxygen Therapy): room air    Physical Exam   Constitutional: He is oriented to person, place, and time. He appears well-developed " and well-nourished. No distress.   HENT:   Head: Normocephalic and atraumatic.   Eyes: EOM are normal. Pupils are equal, round, and reactive to light.   Neck: Normal range of motion. No JVD present.   Cardiovascular: Regular rhythm, normal heart sounds and intact distal pulses.  Exam reveals no gallop and no friction rub.    No murmur heard.  Tachycardic. Right neck TVP   Pulmonary/Chest: Effort normal and breath sounds normal. No respiratory distress. He has no wheezes. He has no rales.   Abdominal: Soft. Bowel sounds are normal. He exhibits no distension. There is no tenderness.   Musculoskeletal: Normal range of motion. He exhibits no edema.   Neurological: He is alert and oriented to person, place, and time.   Skin: Skin is warm. No rash noted. He is not diaphoretic.   Psychiatric: He has a normal mood and affect. His behavior is normal.       Significant Labs:     Recent Results (from the past 24 hour(s))   Basic metabolic panel    Collection Time: 07/26/18  9:00 PM   Result Value Ref Range    Sodium 139 136 - 145 mmol/L    Potassium 2.7 (LL) 3.5 - 5.1 mmol/L    Chloride 97 95 - 110 mmol/L    CO2 34 (H) 23 - 29 mmol/L    Glucose 156 (H) 70 - 110 mg/dL    BUN, Bld 23 8 - 23 mg/dL    Creatinine 1.0 0.5 - 1.4 mg/dL    Calcium 9.0 8.7 - 10.5 mg/dL    Anion Gap 8 8 - 16 mmol/L    eGFR if African American >60.0 >60 mL/min/1.73 m^2    eGFR if non African American >60.0 >60 mL/min/1.73 m^2   Magnesium    Collection Time: 07/26/18  9:00 PM   Result Value Ref Range    Magnesium 2.1 1.6 - 2.6 mg/dL   CBC auto differential    Collection Time: 07/27/18  5:33 AM   Result Value Ref Range    WBC 14.35 (H) 3.90 - 12.70 K/uL    RBC 3.83 (L) 4.60 - 6.20 M/uL    Hemoglobin 13.1 (L) 14.0 - 18.0 g/dL    Hematocrit 37.8 (L) 40.0 - 54.0 %    MCV 99 (H) 82 - 98 fL    MCH 34.2 (H) 27.0 - 31.0 pg    MCHC 34.7 32.0 - 36.0 g/dL    RDW 12.7 11.5 - 14.5 %    Platelets 191 150 - 350 K/uL    MPV 9.5 9.2 - 12.9 fL    Immature Granulocytes 0.6  (H) 0.0 - 0.5 %    Gran # (ANC) 11.9 (H) 1.8 - 7.7 K/uL    Immature Grans (Abs) 0.08 (H) 0.00 - 0.04 K/uL    Lymph # 1.4 1.0 - 4.8 K/uL    Mono # 0.8 0.3 - 1.0 K/uL    Eos # 0.1 0.0 - 0.5 K/uL    Baso # 0.03 0.00 - 0.20 K/uL    nRBC 0 0 /100 WBC    Gran% 83.0 (H) 38.0 - 73.0 %    Lymph% 10.0 (L) 18.0 - 48.0 %    Mono% 5.9 4.0 - 15.0 %    Eosinophil% 0.3 0.0 - 8.0 %    Basophil% 0.2 0.0 - 1.9 %    Differential Method Automated    Basic metabolic panel    Collection Time: 07/27/18  5:33 AM   Result Value Ref Range    Sodium 139 136 - 145 mmol/L    Potassium 3.5 3.5 - 5.1 mmol/L    Chloride 98 95 - 110 mmol/L    CO2 32 (H) 23 - 29 mmol/L    Glucose 137 (H) 70 - 110 mg/dL    BUN, Bld 19 8 - 23 mg/dL    Creatinine 0.8 0.5 - 1.4 mg/dL    Calcium 8.8 8.7 - 10.5 mg/dL    Anion Gap 9 8 - 16 mmol/L    eGFR if African American >60.0 >60 mL/min/1.73 m^2    eGFR if non African American >60.0 >60 mL/min/1.73 m^2   Potassium    Collection Time: 07/27/18  8:48 AM   Result Value Ref Range    Potassium 3.6 3.5 - 5.1 mmol/L     Assessment and Plan:     S/P TAVR (transcatheter aortic valve replacement)    79 y/o M s/p TAVR, pre-op RBBB with LAFB, nevertheless his native conduction is preserved. Also has some brief paroxysms of SVT likely Atrial Tachycardia.    Pre-op EKG  ms with RBBB and LAFB  Post-op EKG  ms with RBBB and LAFB  EKG this morning  ms with RBBB and LAFB  Patient tachycardic this morning and therefore TVP not tested  Repeat EKG shows sinus tachycardia  No need for EP study presently, ok to discontinue TVP on discharge, 30 day event monitor ordered             Peter Vargas MD  Cardiac Electrophysiology  [facility]

## 2018-07-27 NOTE — ASSESSMENT & PLAN NOTE
77 y/o M s/p TAVR, pre-op RBBB with LAFB, nevertheless his native conduction is preserved. Also has some brief paroxysms of SVT likely Atrial Tachycardia.    Pre-op EKG  ms with RBBB and LAFB  Post-op EKG  ms with RBBB and LAFB  EKG this morning  ms with RBBB and LAFB  Patient tachycardic this morning and therefore TVP not tested  Repeat EKG shows sinus tachycardia  No need for EP study presently, ok to discontinue TVP on discharge, 30 day event monitor ordered

## 2018-07-27 NOTE — PROGRESS NOTES
Dr. Jeronimo with Interventional Cards called regarding pt's last resulted potassium of 2.6 with minimal replacements given over day shift and no repeat bmp. MD gave order to give pt 40mEq PO now and check STAT BMP,Mag. PRN replacements also placed. MD also made aware of pt's complaints of left eye pain with moderate redness noted to inner sclera and small amount of tearing with eye opening. MD stated he would consult Opthalmology in AM and to place order for prn artificial tears. Will continue to monitor.

## 2018-07-27 NOTE — SUBJECTIVE & OBJECTIVE
"Interval History: Patient reports feeling "fair" this morning. He denies dizziness, light headedness or syncope. He reports right lower extremity pain in the lateral femoral region, not at the access site. He reports no further symptoms or concerns at this time.    Review of Systems   Constitution: Negative for chills, diaphoresis, fever and night sweats.   HENT: Negative.    Eyes: Negative for blurred vision and photophobia.   Cardiovascular: Negative for chest pain, cyanosis, irregular heartbeat, near-syncope, palpitations and syncope.   Respiratory: Negative for cough, hemoptysis, shortness of breath and wheezing.    Skin: Negative for color change and rash.   Musculoskeletal: Negative for back pain and falls.   Gastrointestinal: Negative for abdominal pain, constipation, diarrhea, nausea and vomiting.   Genitourinary: Negative for dysuria and hematuria.   Neurological: Negative for focal weakness, numbness and seizures.   Psychiatric/Behavioral: Negative for altered mental status. The patient is not nervous/anxious.      Objective:     Vital Signs (Most Recent):  Temp: 98.5 °F (36.9 °C) (07/27/18 0701)  Pulse: 109 (07/27/18 1300)  Resp: 16 (07/27/18 1300)  BP: (!) 107/55 (07/27/18 1200)  SpO2: 100 % (07/27/18 1300) Vital Signs (24h Range):  Temp:  [97 °F (36.1 °C)-99.7 °F (37.6 °C)] 98.5 °F (36.9 °C)  Pulse:  [] 109  Resp:  [16-32] 16  SpO2:  [94 %-100 %] 100 %  BP: (107-135)/(55-94) 107/55  Arterial Line BP: (112-166)/(41-75) 143/59     Weight: 104.8 kg (231 lb)  Body mass index is 29.66 kg/m².     SpO2: 100 %  O2 Device (Oxygen Therapy): room air    Physical Exam   Constitutional: He is oriented to person, place, and time. He appears well-developed and well-nourished. No distress.   HENT:   Head: Normocephalic and atraumatic.   Eyes: EOM are normal. Pupils are equal, round, and reactive to light.   Neck: Normal range of motion. No JVD present.   Cardiovascular: Regular rhythm, normal heart sounds and " intact distal pulses.  Exam reveals no gallop and no friction rub.    No murmur heard.  Tachycardic. Right neck TVP   Pulmonary/Chest: Effort normal and breath sounds normal. No respiratory distress. He has no wheezes. He has no rales.   Abdominal: Soft. Bowel sounds are normal. He exhibits no distension. There is no tenderness.   Musculoskeletal: Normal range of motion. He exhibits no edema.   Neurological: He is alert and oriented to person, place, and time.   Skin: Skin is warm. No rash noted. He is not diaphoretic.   Psychiatric: He has a normal mood and affect. His behavior is normal.       Significant Labs:     Recent Results (from the past 24 hour(s))   Basic metabolic panel    Collection Time: 07/26/18  9:00 PM   Result Value Ref Range    Sodium 139 136 - 145 mmol/L    Potassium 2.7 (LL) 3.5 - 5.1 mmol/L    Chloride 97 95 - 110 mmol/L    CO2 34 (H) 23 - 29 mmol/L    Glucose 156 (H) 70 - 110 mg/dL    BUN, Bld 23 8 - 23 mg/dL    Creatinine 1.0 0.5 - 1.4 mg/dL    Calcium 9.0 8.7 - 10.5 mg/dL    Anion Gap 8 8 - 16 mmol/L    eGFR if African American >60.0 >60 mL/min/1.73 m^2    eGFR if non African American >60.0 >60 mL/min/1.73 m^2   Magnesium    Collection Time: 07/26/18  9:00 PM   Result Value Ref Range    Magnesium 2.1 1.6 - 2.6 mg/dL   CBC auto differential    Collection Time: 07/27/18  5:33 AM   Result Value Ref Range    WBC 14.35 (H) 3.90 - 12.70 K/uL    RBC 3.83 (L) 4.60 - 6.20 M/uL    Hemoglobin 13.1 (L) 14.0 - 18.0 g/dL    Hematocrit 37.8 (L) 40.0 - 54.0 %    MCV 99 (H) 82 - 98 fL    MCH 34.2 (H) 27.0 - 31.0 pg    MCHC 34.7 32.0 - 36.0 g/dL    RDW 12.7 11.5 - 14.5 %    Platelets 191 150 - 350 K/uL    MPV 9.5 9.2 - 12.9 fL    Immature Granulocytes 0.6 (H) 0.0 - 0.5 %    Gran # (ANC) 11.9 (H) 1.8 - 7.7 K/uL    Immature Grans (Abs) 0.08 (H) 0.00 - 0.04 K/uL    Lymph # 1.4 1.0 - 4.8 K/uL    Mono # 0.8 0.3 - 1.0 K/uL    Eos # 0.1 0.0 - 0.5 K/uL    Baso # 0.03 0.00 - 0.20 K/uL    nRBC 0 0 /100 WBC    Gran%  83.0 (H) 38.0 - 73.0 %    Lymph% 10.0 (L) 18.0 - 48.0 %    Mono% 5.9 4.0 - 15.0 %    Eosinophil% 0.3 0.0 - 8.0 %    Basophil% 0.2 0.0 - 1.9 %    Differential Method Automated    Basic metabolic panel    Collection Time: 07/27/18  5:33 AM   Result Value Ref Range    Sodium 139 136 - 145 mmol/L    Potassium 3.5 3.5 - 5.1 mmol/L    Chloride 98 95 - 110 mmol/L    CO2 32 (H) 23 - 29 mmol/L    Glucose 137 (H) 70 - 110 mg/dL    BUN, Bld 19 8 - 23 mg/dL    Creatinine 0.8 0.5 - 1.4 mg/dL    Calcium 8.8 8.7 - 10.5 mg/dL    Anion Gap 9 8 - 16 mmol/L    eGFR if African American >60.0 >60 mL/min/1.73 m^2    eGFR if non African American >60.0 >60 mL/min/1.73 m^2   Potassium    Collection Time: 07/27/18  8:48 AM   Result Value Ref Range    Potassium 3.6 3.5 - 5.1 mmol/L

## 2018-07-27 NOTE — PLAN OF CARE
"Problem: Patient Care Overview  Goal: Plan of Care Review  Outcome: Ongoing (interventions implemented as appropriate)  No acute events overnight. Opthamology consulted placed earlier in shift due to pt's reports of significant eye pain to left eye. Primary RN spoke to Dr. Andres about pt's blurred vision, sensitivity to touch/light, and surface pain. Pt stating "it feels like something is in my eye". MD gave order for antibiotic eye ointment and stated he would see pt first thing in the morning. Pupils remain equal and reactive to light. Neuro at baseline, pt oriented X4 however has some situational confusion at times with awakening from sleep. Pt easily reoriented. Moves all extremities with moderate generalized weakness noted when ambulating with 2 person assist to bedside commode. HR  during shift, SB to ST. Repeat EKGs performed and sent to [place]. BP required no levo support all shift. SBP 100s-150s. Afebrile with Tmax 99.6. Incision sites without signs of complication. All pulses palpable. Oxygen titrated off with sats maintaining above 92% on room air. No bm. Pt voiding adequate urine per urinal. Bladder scan performed with minimal residual urine noted in bladder. Potassium replaced extensively with recheck lab ordered to reflect all prn doses received. Plan for step down or discharge today.      "

## 2018-07-27 NOTE — HPI
Mr. Wilcox is a 78 year old gentleman initially referred by Dr. Rojo for evaluation of Aortic Stenosis here for follow up. He was deemed Cohort C for TAVR in May, 2018 and recommended for rehab. Patient increased his exercise at home including swimming and here for follow up. He reports NYHA FC III symptoms now.        He has a PMHx of chronic hip and current back pain (on tramodol multiple times a day) walks with a walker, ankylosing spondylitis, HTN, HLD, chronic diastolic heart failure, GERD and BPH. He cannot perform his ADLs and has assistance by family. He gets dyspneic ambulating 50ft with his walker to the mailbox. He denies chest pain, light headedness, syncope. He spends his days in a chair and only ambulates with his walker to get the mail. His SOB is somewhat alleviated with Lasix. He has orthopnea for which he sleeps in a recliner.       Saumya Wilcox is a 78 y.o. male referred by Dr Rojo for evaluation of severe AS (NYHA Class III sx).     he has undergone the following TAVR work-up:   · ECHO (Date 5/25/2018): JOEY 0.84 cm2, AVAi 0.36 cm2/m2, peak aortic jet velocity 4.6 m/s,MG 58 mmHg, LVEF 65%  · LHC (Date 6/8/18 ): Non-dominant mRCA 99% disease. LCx and LAD non obstructive  · STS: 4.63%   · Frailty: 1/4  · Iliacs are >9.54 on L and > 9.47 on R   · LVOT area by CTA is 4.86 cm2 and Avg Diameter is 24.9 (distance 29.0 mm x 21.6 mm) per Dr Phelps   · CT Incidental Findings: Ankylosing Spondylitis   · CTS risk assessment: Inoperable due to impaired mobility per Dr. Aburto   · Rhythm Issues: Pending   · 6 MWT: 500 ft (in 3:39)  · PFTs: moderate FEV1 56% predicted, FVC 60% predicted, MVV 38% predicted, DLCO 102% predicted     He is a  candidate for 26mm S3 (7% OS) via RTF access.

## 2018-07-30 DIAGNOSIS — I35.0 NODULAR CALCIFIC AORTIC VALVE STENOSIS: Primary | ICD-10-CM

## 2018-08-07 NOTE — OP NOTE
DATE OF PROCEDURE: 07/26/2018    ATTENDING SURGEONS: GARY Sinclair MD and Ravindra aKur M.D.    PREOPERATIVE DIAGNOSES:  1. Severe aortic stenosis.    POSTOPERATIVE DIAGNOSES:  1. Severe aortic stenosis      OPERATION PERFORMED: Transcatheter aortic valve insertion via right transfemoral   approach using a 26 mm [employer] Woody S3 valve    ANESTHESIA: General.    ESTIMATED BLOOD LOSS: 10 mL.    BRIEF HISTORY: This patient is a 78-year-old man with severe aortic stenosis.  The patient has had progressive dyspnea on exertion. This prompted a thoughtful and thorough evaluation, which demonstrated severe aortic stenosis. Given the comorbid conditions, a transcatheter valve insertion was recommended. The patient now presents for transcatheter aortic valve insertion.    PROCEDURE: After obtaining informed and written consent, the patient was   brought to the cath lab and placed on the cath table in supine position. After   induction of adequate anesthesia, a transvenous pacing wire was placed.   Bilateral femoral arterial and femoral venous access was obtained.  A wire was advanced across the aortic valve. It was exchanged for stiff wire, and  a 22 mm [place] balloon was placed. Under rapid ventricular pacing, balloon valvuloplasty was performed. The balloon was then withdrawn, and a 26 mm S3 valve was advanced to the level of the aortic annulus. Once the team was satisfied that the valve was in proper position, it was deployed. Excellent positioning was obtained. Post-procedure echo demonstrated no aortic insufficiency. The femoral access sites were controlled using percutaneous techniques. The patient tolerated the procedure well, there were no complications. At the conclusion of the case, sponge and instrument counts were correct.

## 2018-09-07 ENCOUNTER — OFFICE VISIT (OUTPATIENT)
Dept: CARDIOLOGY | Facility: CLINIC | Age: 78
End: 2018-09-07
Payer: MEDICARE

## 2018-09-07 ENCOUNTER — HOSPITAL ENCOUNTER (OUTPATIENT)
Dept: CARDIOLOGY | Facility: CLINIC | Age: 78
Discharge: HOME | End: 2018-09-07
Attending: INTERNAL MEDICINE
Payer: MEDICARE

## 2018-09-07 ENCOUNTER — DOCUMENTATION ONLY (OUTPATIENT)
Dept: CARDIOLOGY | Facility: CLINIC | Age: 78
End: 2018-09-07

## 2018-09-07 VITALS
SYSTOLIC BLOOD PRESSURE: 108 MMHG | DIASTOLIC BLOOD PRESSURE: 57 MMHG | OXYGEN SATURATION: 97 % | WEIGHT: 223.31 LBS | BODY MASS INDEX: 30.25 KG/M2 | HEART RATE: 58 BPM | HEIGHT: 72 IN

## 2018-09-07 DIAGNOSIS — Z95.2 S/P TAVR (TRANSCATHETER AORTIC VALVE REPLACEMENT): ICD-10-CM

## 2018-09-07 DIAGNOSIS — I35.0 NODULAR CALCIFIC AORTIC VALVE STENOSIS: ICD-10-CM

## 2018-09-07 DIAGNOSIS — I10 HYPERTENSION, UNSPECIFIED TYPE: ICD-10-CM

## 2018-09-07 DIAGNOSIS — I50.33 ACUTE ON CHRONIC DIASTOLIC HEART FAILURE: ICD-10-CM

## 2018-09-07 DIAGNOSIS — I35.0 NODULAR CALCIFIC AORTIC VALVE STENOSIS: Primary | ICD-10-CM

## 2018-09-07 DIAGNOSIS — E78.49 OTHER HYPERLIPIDEMIA: ICD-10-CM

## 2018-09-07 LAB
ESTIMATED PA SYSTOLIC PRESSURE: 25.09
RETIRED EF AND QEF - SEE NOTES: 68 (ref 55–65)
TRICUSPID VALVE REGURGITATION: NORMAL

## 2018-09-07 PROCEDURE — 3074F SYST BP LT 130 MM HG: CPT | Mod: CPTII,,, | Performed by: INTERNAL MEDICINE

## 2018-09-07 PROCEDURE — 3078F DIAST BP <80 MM HG: CPT | Mod: CPTII,,, | Performed by: INTERNAL MEDICINE

## 2018-09-07 PROCEDURE — 1101F PT FALLS ASSESS-DOCD LE1/YR: CPT | Mod: CPTII,,, | Performed by: INTERNAL MEDICINE

## 2018-09-07 PROCEDURE — 99213 OFFICE O/P EST LOW 20 MIN: CPT | Mod: S$PBB,,, | Performed by: INTERNAL MEDICINE

## 2018-09-07 PROCEDURE — 99214 OFFICE O/P EST MOD 30 MIN: CPT | Mod: PBBFAC,25 | Performed by: INTERNAL MEDICINE

## 2018-09-07 PROCEDURE — 99999 PR PBB SHADOW E&M-EST. PATIENT-LVL IV: CPT | Mod: PBBFAC,,, | Performed by: INTERNAL MEDICINE

## 2018-09-07 PROCEDURE — 93306 TTE W/DOPPLER COMPLETE: CPT | Mod: 26,S$PBB,, | Performed by: INTERNAL MEDICINE

## 2018-09-07 RX ORDER — FLUTICASONE PROPIONATE 50 MCG
SPRAY, SUSPENSION (ML) NASAL
COMMUNITY
Start: 2018-08-03

## 2018-09-07 NOTE — PROGRESS NOTES
Subjective:    Patient ID:  Saumya Wilcox is a 78 y.o. male who presents for follow-up after TAVR    HPI    Saumya Wilcox is a 78 y.o. male patient of Dr. Rojo referred for severe AS. He underwent right transfemoral aortic valve replacement with 26 mm Woody S3 Valve (+1mL) on 7.26.18. He has also a history of back pain (on tramodol multiple times a day) walks with a walker, ankylosing spondylitis, HTN, HLD, chronic diastolic heart failure, GERD and BPH.     Since procedure he reports feeling well, he's lost 10 lbs and is able to do all of his activities. He denies any chest pain or syncope., he reports shortness of breath after heady activity.    NYHA class II CCS Class 0        Review of Systems   Constitution: Negative for fever and weakness.   HENT: Negative for congestion and hoarse voice.    Eyes: Negative for blurred vision and double vision.   Cardiovascular: Positive for dyspnea on exertion. Negative for chest pain, claudication, cyanosis, irregular heartbeat, leg swelling, near-syncope, orthopnea, palpitations and paroxysmal nocturnal dyspnea.   Respiratory: Negative for cough, hemoptysis and shortness of breath.    Endocrine: Negative for cold intolerance and heat intolerance.   Hematologic/Lymphatic: Negative for bleeding problem. Does not bruise/bleed easily.   Skin: Negative for dry skin and nail changes.   Musculoskeletal: Negative for back pain and falls.   Gastrointestinal: Negative for abdominal pain and anorexia.   Neurological: Negative for brief paralysis and dizziness.        Objective:    Physical Exam   Constitutional: He is oriented to person, place, and time. He appears well-developed and well-nourished.   Eyes: Pupils are equal, round, and reactive to light.   Neck: No JVD present. No thyromegaly present.   Cardiovascular: Normal rate, regular rhythm, normal heart sounds and intact distal pulses.   Pulmonary/Chest: No respiratory distress. He has no wheezes. He exhibits no  tenderness.   Abdominal: He exhibits no distension. There is no tenderness. There is no rebound.   Musculoskeletal: He exhibits no edema or tenderness.   Neurological: He is alert and oriented to person, place, and time.   Skin: Skin is warm and dry.   Psychiatric: He has a normal mood and affect.         2D ECHO TODAY  CONCLUSIONS     1 - Normal left ventricular systolic function (EF 65-70%).     2 - Concentric remodeling.     3 - No wall motion abnormalities.     4 - Indeterminate LV diastolic function.     5 - Right ventricular enlargement with normal systolic function.     6 - Aortic valve prosthesis, JOEY = 4.95 cm2, AVAi = 2.14 cm2/m2, peak velocity = 2.16 m/s, mean gradient = 13 mmHg.     7 - Trivial tricuspid regurgitation.     8 - The estimated PA systolic pressure is 25 mmHg.       Assessment:       1. Nodular calcific aortic valve stenosis    2. S/P TAVR (transcatheter aortic valve replacement)    3. Hypertension, unspecified type    4. Other hyperlipidemia    5. Acute on chronic diastolic heart failure         Plan:         S/P TAVR (transcatheter aortic valve replacement)  Doing well, echo shows no PVL and low gradients. Follow up with Dr Ulrich as scheduled and with us in 1 year    Acute on chronic diastolic heart failure  Continue lasix    Other hyperlipidemia  Stable on statin       RTC in 1 year      Chilango Jeronimo MD [facility]  Interventional Cardiology  Structural/Valvular heart disease  [phone]

## 2018-09-07 NOTE — ASSESSMENT & PLAN NOTE
Doing well, echo shows no PVL and low gradients. Follow up with Dr Ulrich as scheduled and with us in 1 year

## 2019-06-21 DIAGNOSIS — I35.0 NODULAR CALCIFIC AORTIC VALVE STENOSIS: Primary | ICD-10-CM

## 2019-08-20 ENCOUNTER — DOCUMENTATION ONLY (OUTPATIENT)
Dept: CARDIOLOGY | Facility: CLINIC | Age: 79
End: 2019-08-20

## 2019-08-20 NOTE — PROGRESS NOTES
Patient cancelled follow up appointments in September for one year post TAVR.  Stated he cannot travel to [facility] at this time.